# Patient Record
Sex: FEMALE | Race: WHITE | HISPANIC OR LATINO | Employment: FULL TIME | URBAN - METROPOLITAN AREA
[De-identification: names, ages, dates, MRNs, and addresses within clinical notes are randomized per-mention and may not be internally consistent; named-entity substitution may affect disease eponyms.]

---

## 2017-07-10 NOTE — TELEPHONE ENCOUNTER
Patient needs a refill for 3 month supply to cover her until her Annual. Please call patient when complete.

## 2017-07-13 NOTE — TELEPHONE ENCOUNTER
REGLA to call back to verify pharmacy- will call in BC to pharmacy on file and can change if she needs it sent somewhere else.  Pended meds  Routed to Dr. HINTON

## 2017-07-18 RX ORDER — LEVONORGESTREL AND ETHINYL ESTRADIOL 0.1-0.02MG
1 KIT ORAL DAILY
Qty: 97 TAB | Refills: 0 | OUTPATIENT
Start: 2017-07-18

## 2017-11-30 ENCOUNTER — HOSPITAL ENCOUNTER (OUTPATIENT)
Facility: MEDICAL CENTER | Age: 27
End: 2017-11-30
Attending: NURSE PRACTITIONER
Payer: COMMERCIAL

## 2017-11-30 ENCOUNTER — GYNECOLOGY VISIT (OUTPATIENT)
Dept: OBGYN | Facility: MEDICAL CENTER | Age: 27
End: 2017-11-30
Payer: COMMERCIAL

## 2017-11-30 VITALS
SYSTOLIC BLOOD PRESSURE: 105 MMHG | WEIGHT: 141 LBS | HEIGHT: 60 IN | BODY MASS INDEX: 27.68 KG/M2 | DIASTOLIC BLOOD PRESSURE: 60 MMHG

## 2017-11-30 DIAGNOSIS — Z01.419 WELL WOMAN EXAM: ICD-10-CM

## 2017-11-30 DIAGNOSIS — Z30.015 ENCOUNTER FOR INITIAL PRESCRIPTION OF VAGINAL RING HORMONAL CONTRACEPTIVE: ICD-10-CM

## 2017-11-30 DIAGNOSIS — Z12.4 SCREENING FOR CERVICAL CANCER: ICD-10-CM

## 2017-11-30 PROCEDURE — 88175 CYTOPATH C/V AUTO FLUID REDO: CPT

## 2017-11-30 PROCEDURE — 99395 PREV VISIT EST AGE 18-39: CPT | Performed by: NURSE PRACTITIONER

## 2017-11-30 RX ORDER — ETONOGESTREL AND ETHINYL ESTRADIOL VAGINAL RING .015; .12 MG/D; MG/D
RING VAGINAL
Qty: 1 EACH | Refills: 3 | Status: SHIPPED | OUTPATIENT
Start: 2017-11-30 | End: 2019-01-18

## 2017-11-30 NOTE — PATIENT INSTRUCTIONS
Ethinyl Estradiol; Etonogestrel vaginal ring  What is this medicine?  ETHINYL ESTRADIOL; ETONOGESTREL (ETH in il es tra DYE ole; et oh roxana LESIA trel) vaginal ring is a flexible, vaginal ring used as a contraceptive (birth control method). This medicine combines two types of female hormones, an estrogen and a progestin. This ring is used to prevent ovulation and pregnancy. Each ring is effective for one month.  This medicine may be used for other purposes; ask your health care provider or pharmacist if you have questions.  COMMON BRAND NAME(S): NuvaRing  What should I tell my health care provider before I take this medicine?  They need to know if you have or ever had any of these conditions:  -abnormal vaginal bleeding  -blood vessel disease or blood clots  -breast, cervical, endometrial, ovarian, liver, or uterine cancer  -diabetes  -gallbladder disease  -heart disease or recent heart attack  -high blood pressure  -high cholesterol  -kidney disease  -liver disease  -migraine headaches  -stroke  -systemic lupus erythematosus (SLE)  -tobacco smoker  -an unusual or allergic reaction to estrogens, progestins, other medicines, foods, dyes, or preservatives  -pregnant or trying to get pregnant  -breast-feeding  How should I use this medicine?  Insert the ring into your vagina as directed. Follow the directions on the prescription label. The ring will remain place for 3 weeks and is then removed for a 1-week break. A new ring is inserted 1 week after the last ring was removed, on the same day of the week. Do not use more often than directed.  A patient package insert for the product will be given with each prescription and refill. Read this sheet carefully each time. The sheet may change frequently.  Contact your pediatrician regarding the use of this medicine in children. Special care may be needed. This medicine has been used in female children who have started having menstrual periods.  Overdosage: If you think you have  taken too much of this medicine contact a poison control center or emergency room at once.  NOTE: This medicine is only for you. Do not share this medicine with others.  What if I miss a dose?  You will need to replace your vaginal ring once a month as directed. If the ring should slip out, or if you leave it in longer or shorter than you should, contact your health care professional for advice.  What may interact with this medicine?  -acetaminophen  -antibiotics or medicines for infections, especially rifampin, rifabutin, rifapentine, and griseofulvin, and possibly penicillins or tetracyclines  -aprepitant  -ascorbic acid (vitamin C)  -atorvastatin  -barbiturate medicines, such as phenobarbital  -bosentan  -carbamazepine  -caffeine  -clofibrate  -cyclosporine  -dantrolene  -doxercalciferol  -felbamate  -grapefruit juice  -hydrocortisone  -medicines for anxiety or sleeping problems, such as diazepam or temazepam  -medicines for diabetes, including pioglitazone  -modafinil  -mycophenolate  -nefazodone  -oxcarbazepine  -phenytoin  -prednisolone  -ritonavir or other medicines for HIV infection or AIDS  -rosuvastatin  -selegiline  -soy isoflavones supplements  -Bharti's wort  -tamoxifen or raloxifene  -theophylline  -thyroid hormones  -topiramate  -warfarin  This list may not describe all possible interactions. Give your health care provider a list of all the medicines, herbs, non-prescription drugs, or dietary supplements you use. Also tell them if you smoke, drink alcohol, or use illegal drugs. Some items may interact with your medicine.  What should I watch for while using this medicine?  Visit your doctor or health care professional for regular checks on your progress. You will need a regular breast and pelvic exam and Pap smear while on this medicine.  Use an additional method of contraception during the first cycle that you use this ring.  If you have any reason to think you are pregnant, stop using this  medicine right away and contact your doctor or health care professional.  If you are using this medicine for hormone related problems, it may take several cycles of use to see improvement in your condition.  Smoking increases the risk of getting a blood clot or having a stroke while you are using hormonal birth control, especially if you are more than 35 years old. You are strongly advised not to smoke.  This medicine can make your body retain fluid, making your fingers, hands, or ankles swell. Your blood pressure can go up. Contact your doctor or health care professional if you feel you are retaining fluid.  This medicine can make you more sensitive to the sun. Keep out of the sun. If you cannot avoid being in the sun, wear protective clothing and use sunscreen. Do not use sun lamps or tanning beds/booths.  If you wear contact lenses and notice visual changes, or if the lenses begin to feel uncomfortable, consult your eye care specialist.  In some women, tenderness, swelling, or minor bleeding of the gums may occur. Notify your dentist if this happens. Brushing and flossing your teeth regularly may help limit this. See your dentist regularly and inform your dentist of the medicines you are taking.  If you are going to have elective surgery, you may need to stop using this medicine before the surgery. Consult your health care professional for advice.  This medicine does not protect you against HIV infection (AIDS) or any other sexually transmitted diseases.  What side effects may I notice from receiving this medicine?  Side effects that you should report to your doctor or health care professional as soon as possible:  -breast tissue changes or discharge  -changes in vaginal bleeding during your period or between your periods  -chest pain  -coughing up blood  -dizziness or fainting spells  -headaches or migraines  -leg, arm or groin pain  -severe or sudden headaches  -stomach pain (severe)  -sudden shortness of  breath  -sudden loss of coordination, especially on one side of the body  -speech problems  -symptoms of vaginal infection like itching, irritation or unusual discharge  -tenderness in the upper abdomen  -vomiting  -weakness or numbness in the arms or legs, especially on one side of the body  -yellowing of the eyes or skin  Side effects that usually do not require medical attention (report to your doctor or health care professional if they continue or are bothersome):  -breakthrough bleeding and spotting that continues beyond the 3 initial cycles of pills  -breast tenderness  -mood changes, anxiety, depression, frustration, anger, or emotional outbursts  -increased sensitivity to sun or ultraviolet light  -nausea  -skin rash, acne, or brown spots on the skin  -weight gain (slight)  This list may not describe all possible side effects. Call your doctor for medical advice about side effects. You may report side effects to FDA at 1-069-FDA-2886.  Where should I keep my medicine?  Keep out of the reach of children.  Store at room temperature between 15 and 30 degrees C (59 and 86 degrees F) for up to 4 months. The product will  after 4 months. Protect from light. Throw away any unused medicine after the expiration date.  NOTE: This sheet is a summary. It may not cover all possible information. If you have questions about this medicine, talk to your doctor, pharmacist, or health care provider.  © 2014, [employer]. (12/3/2009 12:03:58 PM)

## 2017-12-01 LAB — CYTOLOGY REG CYTOL: NORMAL

## 2017-12-01 NOTE — PROGRESS NOTES
CC: Soy Parker is a 27 y.o. y.o. female who presents for her Gynecologic Exam        HPI Comments: Pt presents for well woman exam. Pt has  complaints today that she has been getting  A lot of Nausea and breast tenderness and ill feelings every month when she restarts her OCP's after her period and placebos. This has been happening for over 6 months now and she desires to trial a new method. Patient's last menstrual period was 11/15/2017.  .  Review of Systems :  Cardio: neg  Respiratory: Asthma  Constitutional: neg  : neg  Abdominal: Nausea with restart of OCPS  Psychosocial: neg  EENT:  Metabolic:  Pertinent positives documented in HPI and all other systems reviewed & are negative    All PMH, PSH, allergies, social history and FH reviewed and updated today:  Past Medical History:   Diagnosis Date   • Anemia    • ASTHMA     exercise-induced asthma   • Nose trouble     nose bleeds     History reviewed. No pertinent surgical history.  Penicillins  Social History     Social History   • Marital status: Single     Spouse name: N/A   • Number of children: N/A   • Years of education: N/A     Social History Main Topics   • Smoking status: Never Smoker   • Smokeless tobacco: Never Used   • Alcohol use No   • Drug use: No   • Sexual activity: Yes     Partners: Male     Other Topics Concern   • Not on file     Social History Narrative   • No narrative on file     Family History   Problem Relation Age of Onset   • Diabetes Maternal Grandmother      type II   • Diabetes Maternal Grandfather      type II   • Heart Disease Maternal Grandfather      stents   • Heart Attack Maternal Grandfather    • Cancer Paternal Grandmother 60     reprodcutive organ   • Heart Attack Paternal Grandfather    • Hyperlipidemia Mother    • Hypertension Mother    • Arthritis Father    • Arthritis Brother      Medications:   Current Outpatient Prescriptions [facility]   Medication Sig Dispense Refill   • ethinyl estradiol-etonogestrel  (NUVARING) 0.12-0.015 MG/24HR vaginal ring Place vaginal ring in the vagina for 28-30 days then remove for 3 days, replace with new ring 1 Each 3     No current Epic-ordered facility-administered medications on file.           Objective:   Vital measurements:  Blood pressure 105/60, height 1.524 m (5'), weight 64 kg (141 lb), last menstrual period 11/15/2017, not currently breastfeeding.  Body mass index is 27.54 kg/m². (Goal BM I>18 <25)    Physical Exam   Nursing note and vitals reviewed.  Constitutional: She is oriented to person, place, and time. She appears well-developed and well-nourished. No distress.     HEENT:   Head: Normocephalic and atraumatic.   Right Ear: External ear normal.   Left Ear: External ear normal.   Nose: Nose normal.   Eyes: Conjunctivae and EOM are normal. Pupils are equal, round, and reactive to light. No scleral icterus.     Neck: Normal range of motion. Neck supple. No tracheal deviation present. No thyromegaly present.     Pulmonary/Chest: Effort normal and breath sounds normal. No respiratory distress. She has no wheezes. She has no rales. She exhibits no tenderness.     Cardiovascular: Regular, rate and rhythm. No JVD.    Abdominal: Soft. Bowel sounds are normal. She exhibits no distension and no mass. No tenderness. She has no rebound and no guarding.     Breast:  Symmetrical, normal consistency without masses., No dimpling or skin changes, Normal nipples without discharge, no axillary lymphadenopathy, negative    Genitourinary:  Pelvic exam was performed with patient supine.  External genitalia with no abnormal pigmentation, labial fusion,rash, tenderness, lesion or injury to the labia bilaterally.  Vagina is moist with no lesions, foul discharge, erythema, tenderness or bleeding. No foreign body around the vagina or signs of injury.   Cervix exhibits no motion tenderness, no discharge and no friability.   Uterus is AV not deviated, not enlarged, not fixed and not tender.  Right  adnexum displays no mass, no tenderness and no fullness. Left adnexum displays no mass, no tenderness and no fullness.     Musculoskeletal: Normal range of motion. She exhibits no edema and no tenderness.     Lymphadenopathy: She has no cervical adenopathy.     Neurological: She is alert and oriented to person, place, and time. She exhibits normal muscle tone.     Skin: Skin is warm and dry. No rash noted. She is not diaphoretic. No erythema. No pallor.     Psychiatric: She has a normal mood and affect. Her behavior is normal. Judgment and thought content normal.               Assessment:     1. Well woman exam  ThinPrep Pap, reflex HPV on ASC-US only   2. Screening for cervical cancer  ThinPrep Pap, reflex HPV on ASC-US only   3. Encounter for initial prescription of vaginal ring hormonal contraceptive  ethinyl estradiol-etonogestrel (NUVARING) 0.12-0.015 MG/24HR vaginal ring         Plan:   Pap and physical exam performed  Monthly SBE.  Counseling: STD prevention, HIV risk factors and prevention, use and side effects of OCPs, family planning choices and adequate intake of calcium and vitamin D, Nuvaring instructions risks and benefits are discussed and pt is given written info as well. Verbalizes understanding  Encourage exercise and proper diet.  Mammograms starting @ age 40 annually.  See medications and orders placed in encounter report.  Return to clinic: 3 months for recheck of new BCM

## 2018-03-14 ENCOUNTER — TELEPHONE (OUTPATIENT)
Dept: MEDICAL GROUP | Age: 28
End: 2018-03-14

## 2018-03-14 DIAGNOSIS — Z23 NEED FOR VACCINATION: ICD-10-CM

## 2018-03-14 NOTE — TELEPHONE ENCOUNTER
Patient is on the MA Schedule tomorrow for HEP A, HPV vaccine/injection.    SPECIFIC Action To Be Taken: Orders pending, please sign.

## 2018-03-15 ENCOUNTER — APPOINTMENT (OUTPATIENT)
Dept: MEDICAL GROUP | Age: 28
End: 2018-03-15
Payer: COMMERCIAL

## 2018-04-03 ENCOUNTER — NON-PROVIDER VISIT (OUTPATIENT)
Dept: MEDICAL GROUP | Age: 28
End: 2018-04-03
Payer: COMMERCIAL

## 2018-04-03 DIAGNOSIS — Z23 NEED FOR VACCINATION: ICD-10-CM

## 2018-04-03 PROCEDURE — 90471 IMMUNIZATION ADMIN: CPT | Performed by: INTERNAL MEDICINE

## 2018-04-03 PROCEDURE — 90651 9VHPV VACCINE 2/3 DOSE IM: CPT | Performed by: INTERNAL MEDICINE

## 2018-04-03 PROCEDURE — 90632 HEPA VACCINE ADULT IM: CPT | Performed by: INTERNAL MEDICINE

## 2018-05-14 ENCOUNTER — TELEPHONE (OUTPATIENT)
Dept: OBGYN | Facility: CLINIC | Age: 28
End: 2018-05-14

## 2018-05-14 NOTE — TELEPHONE ENCOUNTER
Called and informed the patient that I called it in to the pharmacy and then after I called they called and said they received it but either way it should be ready for her in a couple of hours. Pt satisfied and has no further questions at this time.

## 2018-05-14 NOTE — TELEPHONE ENCOUNTER
----- Message from Valentina Stafford sent at 5/14/2018 11:47 AM PDT -----  Pt states pharm has contacted us several times re: her BC Nuvaring - they won't fill it until we call them

## 2018-06-05 ENCOUNTER — OFFICE VISIT (OUTPATIENT)
Dept: MEDICAL GROUP | Age: 28
End: 2018-06-05
Payer: COMMERCIAL

## 2018-06-05 VITALS
HEART RATE: 67 BPM | WEIGHT: 141 LBS | HEIGHT: 60 IN | TEMPERATURE: 97.7 F | BODY MASS INDEX: 27.68 KG/M2 | SYSTOLIC BLOOD PRESSURE: 112 MMHG | DIASTOLIC BLOOD PRESSURE: 66 MMHG | OXYGEN SATURATION: 98 %

## 2018-06-05 DIAGNOSIS — R19.09 CENTRAL ABDOMINAL MASS: Primary | ICD-10-CM

## 2018-06-05 PROCEDURE — 99213 OFFICE O/P EST LOW 20 MIN: CPT | Performed by: FAMILY MEDICINE

## 2018-06-05 ASSESSMENT — PATIENT HEALTH QUESTIONNAIRE - PHQ9: CLINICAL INTERPRETATION OF PHQ2 SCORE: 0

## 2018-06-05 NOTE — PROGRESS NOTES
Subjective:   CC: Abdominal mass    HPI:     Soy Zhu is a 28 y.o. female, established patient of the clinic, presents with the following concerns:     Patient was in her normal state of health until approximately 7 days ago when she noticed a small, mildly tender, abdominal mass between the rectus abdominis muscles. Nothing makes symptoms worse or better. Patient has been manipulating the concerning area leading to minor discomfort. Patient states as she recently started weight training. Denies fever, chills, nausea, vomiting, abdominal pain, hematochezia, melena.    Current medicines (including changes today)  Current Outpatient Prescriptions   Medication Sig Dispense Refill   • ethinyl estradiol-etonogestrel (NUVARING) 0.12-0.015 MG/24HR vaginal ring Place vaginal ring in the vagina for 28-30 days then remove for 3 days, replace with new ring 1 Each 3     No current facility-administered medications for this visit.      She  has a past medical history of Anemia; ASTHMA; and Nose trouble. She also has no past medical history of Diabetes or Seizure ([facility]).    I personally reviewed patient's problem list, allergies, medications, family hx, social hx with patient and update [facility].     REVIEW OF SYSTEMS:  CONSTITUTIONAL:  Denies night sweats, fatigue, malaise, lethargy, fever or chills.  RESPIRATORY:  Denies cough, wheeze, hemoptysis, or shortness of breath.  CARDIOVASCULAR:  Denies chest pains, palpitations, pedal edema     Objective:     Blood pressure 112/66, pulse 67, temperature 36.5 °C (97.7 °F), height 1.524 m (5'), weight 64 kg (141 lb), SpO2 98 %. Body mass index is 27.54 kg/m².    Physical Exam:  Constitutional: awake, alert, in no distress.  Skin: Warm, dry, good turgor, no rashes, bruises, ulcers in visible areas.  Respiratory: Unlabored respiratory effort, lungs clear to auscultation, no wheezes, no rales.  Cardiovascular: Normal S1, S2, no murmur, no pedal edema.  Abdomen: Soft, non-tender to  palpation, active BS, no hepatosplenomegaly, negative rebound or guarding.   - 4 cm, elongated, firm, mildly tender mass located between the rectus abdominis, not worse with valsalva.   Psych: Oriented x3, affect and mood wnl, intact judgement and insight.       Assessment and Plan:   The following treatment plan was discussed    1. Central abdominal mass  History and exam concerning for diastasis associated with recent increased exercise intensity and weigh training.   - US-HERNIA ABDOMEN; Future  - general counseling regarding pathogenesis, prevention, and treatment of this condition provided.     Claudia Rojas M.D.      Followup: Return for As needed.    Please note that this dictation was created using voice recognition software. I have made every reasonable attempt to correct obvious errors, but I expect that there are errors of grammar and possibly content that I did not discover before finalizing the note.

## 2018-06-13 ENCOUNTER — HOSPITAL ENCOUNTER (OUTPATIENT)
Dept: RADIOLOGY | Facility: MEDICAL CENTER | Age: 28
End: 2018-06-13
Attending: FAMILY MEDICINE
Payer: COMMERCIAL

## 2018-06-13 DIAGNOSIS — R19.09 CENTRAL ABDOMINAL MASS: ICD-10-CM

## 2018-06-13 PROCEDURE — 76705 ECHO EXAM OF ABDOMEN: CPT

## 2018-06-14 PROBLEM — K43.9 VENTRAL HERNIA WITHOUT OBSTRUCTION OR GANGRENE: Status: ACTIVE | Noted: 2018-06-14

## 2018-08-06 NOTE — PROGRESS NOTES
"Soy Zhu is a 28 y.o. female here for a non-provider visit for:   HEPATITIS A 1 of 2  HPV 1 of 3    Reason for immunization: Overdue/Provider Recommended  Immunization records indicate need for vaccine: Yes, confirmed with Epic and confirmed with [employer]  Minimum interval has been met for this vaccine: Yes  ABN completed: Not Indicated    Order and dose verified by: Unknown  VIS Dated  HEP A 7/20/16 and HPV 12/2/16  was given to patient: Yes  All IAC Questionnaire questions were answered \"No.\"    Patient tolerated injection and no adverse effects were observed or reported: Yes    Pt scheduled for next dose in series: No      * This was administered by Joanna Carr*    "

## 2018-10-31 ENCOUNTER — TELEPHONE (OUTPATIENT)
Dept: OBGYN | Facility: CLINIC | Age: 28
End: 2018-10-31

## 2018-10-31 NOTE — TELEPHONE ENCOUNTER
Pt called to get a refill for Nuvaring, was advised to call office to set up an appt.  Rx was called in for a 3 month suplpy refill.  Pt agreed to set up an appt and understands that won't  be able to get more refill if she doesn't get an annual.

## 2019-01-18 ENCOUNTER — OFFICE VISIT (OUTPATIENT)
Dept: MEDICAL GROUP | Age: 29
End: 2019-01-18
Payer: COMMERCIAL

## 2019-01-18 VITALS
WEIGHT: 150 LBS | HEIGHT: 60 IN | DIASTOLIC BLOOD PRESSURE: 60 MMHG | OXYGEN SATURATION: 98 % | BODY MASS INDEX: 29.45 KG/M2 | TEMPERATURE: 98.2 F | HEART RATE: 86 BPM | SYSTOLIC BLOOD PRESSURE: 102 MMHG

## 2019-01-18 DIAGNOSIS — Z23 NEED FOR VACCINATION: ICD-10-CM

## 2019-01-18 DIAGNOSIS — Z02.0 SCHOOL PHYSICAL EXAM: ICD-10-CM

## 2019-01-18 DIAGNOSIS — E66.01 OBESITY, MORBID, BMI 40.0-49.9 (HCC): ICD-10-CM

## 2019-01-18 PROCEDURE — 99395 PREV VISIT EST AGE 18-39: CPT | Performed by: FAMILY MEDICINE

## 2019-01-18 ASSESSMENT — PATIENT HEALTH QUESTIONNAIRE - PHQ9: CLINICAL INTERPRETATION OF PHQ2 SCORE: 0

## 2019-01-19 ENCOUNTER — HOSPITAL ENCOUNTER (OUTPATIENT)
Dept: LAB | Facility: MEDICAL CENTER | Age: 29
End: 2019-01-19
Attending: FAMILY MEDICINE
Payer: COMMERCIAL

## 2019-01-19 DIAGNOSIS — Z23 NEED FOR VACCINATION: ICD-10-CM

## 2019-01-19 DIAGNOSIS — E66.01 OBESITY, MORBID, BMI 40.0-49.9 (HCC): ICD-10-CM

## 2019-01-19 LAB
ALBUMIN SERPL BCP-MCNC: 4.2 G/DL (ref 3.2–4.9)
ALBUMIN/GLOB SERPL: 1.5 G/DL
ALP SERPL-CCNC: 41 U/L (ref 30–99)
ALT SERPL-CCNC: 12 U/L (ref 2–50)
ANION GAP SERPL CALC-SCNC: 7 MMOL/L (ref 0–11.9)
AST SERPL-CCNC: 19 U/L (ref 12–45)
BILIRUB SERPL-MCNC: 0.6 MG/DL (ref 0.1–1.5)
BUN SERPL-MCNC: 7 MG/DL (ref 8–22)
CALCIUM SERPL-MCNC: 9.5 MG/DL (ref 8.5–10.5)
CHLORIDE SERPL-SCNC: 108 MMOL/L (ref 96–112)
CHOLEST SERPL-MCNC: 148 MG/DL (ref 100–199)
CO2 SERPL-SCNC: 26 MMOL/L (ref 20–33)
CREAT SERPL-MCNC: 0.86 MG/DL (ref 0.5–1.4)
ERYTHROCYTE [DISTWIDTH] IN BLOOD BY AUTOMATED COUNT: 41.5 FL (ref 35.9–50)
GLOBULIN SER CALC-MCNC: 2.8 G/DL (ref 1.9–3.5)
GLUCOSE SERPL-MCNC: 86 MG/DL (ref 65–99)
HCT VFR BLD AUTO: 39.9 % (ref 37–47)
HDLC SERPL-MCNC: 46 MG/DL
HGB BLD-MCNC: 13.2 G/DL (ref 12–16)
LDLC SERPL CALC-MCNC: 88 MG/DL
MCH RBC QN AUTO: 29.4 PG (ref 27–33)
MCHC RBC AUTO-ENTMCNC: 33.1 G/DL (ref 33.6–35)
MCV RBC AUTO: 88.9 FL (ref 81.4–97.8)
PLATELET # BLD AUTO: 173 K/UL (ref 164–446)
PMV BLD AUTO: 12.4 FL (ref 9–12.9)
POTASSIUM SERPL-SCNC: 3.9 MMOL/L (ref 3.6–5.5)
PROT SERPL-MCNC: 7 G/DL (ref 6–8.2)
RBC # BLD AUTO: 4.49 M/UL (ref 4.2–5.4)
SODIUM SERPL-SCNC: 141 MMOL/L (ref 135–145)
TRIGL SERPL-MCNC: 69 MG/DL (ref 0–149)
VZV IGG SER IA-ACNC: 2.05
WBC # BLD AUTO: 6.9 K/UL (ref 4.8–10.8)

## 2019-01-19 PROCEDURE — 36415 COLL VENOUS BLD VENIPUNCTURE: CPT

## 2019-01-19 PROCEDURE — 85027 COMPLETE CBC AUTOMATED: CPT

## 2019-01-19 PROCEDURE — 80061 LIPID PANEL: CPT

## 2019-01-19 PROCEDURE — 80053 COMPREHEN METABOLIC PANEL: CPT

## 2019-01-19 PROCEDURE — 86787 VARICELLA-ZOSTER ANTIBODY: CPT

## 2019-01-19 NOTE — ASSESSMENT & PLAN NOTE
The patient is a 28-year-old female who presents to clinic with a chief complaint of needing a physical and medical clearance to attend  [employer] school.  She has no active medical history, she takes no prescription medications.   The patient denied any chest pain, no sob, no collins, no  pnd, no orthopnea, no headache, no changes in vision, no numbness or tingling, no nausea, no diarrhea, no abdominal pain, no fevers, no chills, no bright red blood per rectum, no  difficulty urinating, no burning during micturition, no depressed mood, no other concerns.

## 2019-01-19 NOTE — PROGRESS NOTES
This medical record contains text that has been entered with the assistance of computer voice recognition and dictation software.  Therefore, it may contain unintended errors in text, spelling, punctuation, or grammar    Chief Complaint   Patient presents with   • Other     clearance for school         Soy Zhu is a 28 y.o. 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  She has no active medical history, she takes no prescription medications.   The patient denied any chest pain, no sob, no collins, no  pnd, no orthopnea, no headache, no changes in vision, no numbness or tingling, no nausea, no diarrhea, no abdominal pain, no fevers, no chills, no bright red blood per rectum, no  difficulty urinating, no burning during micturition, no depressed mood, no other concerns.            Current medicines (including changes today)  No current outpatient prescriptions on file.     No current facility-administered medications for this visit.      She  has a past medical history of Anemia; ASTHMA; and Nose trouble. She also has no past medical history of Diabetes or Seizure ([facility]).  She  has no past surgical history on file.  Social History   Substance Use Topics   • Smoking status: Never Smoker   • Smokeless tobacco: Never Used   • Alcohol use No     Social History     Social History Narrative   • No narrative on file     Family History   Problem Relation Age of Onset   • Diabetes Maternal Grandmother         type II   • Diabetes Maternal Grandfather         type II   • Heart Disease Maternal Grandfather         stents   • Heart Attack Maternal Grandfather    • Cancer Paternal Grandmother 60        reprodcutive organ   • Heart Attack Paternal Grandfather    • Hyperlipidemia Mother    • Hypertension Mother    •  Arthritis Father    • Arthritis Brother      Family Status   Relation Status   • MGMo Alive   • MGFa Alive   • PGMo Alive   • PGFa  at age 45        Heart attack   • Mo Alive   • Fa Alive   • Yanci Alive   • Bro Alive   • Bro Alive         ROS    Please see hpi     All other systems reviewed and are negative     Objective:     Blood pressure 102/60, pulse 86, temperature 36.8 °C (98.2 °F), temperature source Temporal, height 1.524 m (5'), weight 68 kg (150 lb), SpO2 98 %, not currently breastfeeding. Body mass index is 29.29 kg/m².  Physical Exam:    Constitutional: Alert, no distress.  Skin: Warm, dry, good turgor, no rashes in visible areas.  Eye: Equal, round and reactive, conjunctiva clear, lids normal.  ENMT: Lips without lesions, good dentition, oropharynx clear.  Neck: Trachea midline, no masses, no thyromegaly. No cervical or supraclavicular lymphadenopathy.  Respiratory: Unlabored respiratory effort, lungs clear to auscultation, no wheezes, no ronchi.  Cardiovascular: Normal S1, S2, no murmur, no edema.  Abdomen: Soft, non-tender, no masses, no hepatosplenomegaly.  Psych: Alert and oriented x3, normal affect and mood.          Assessment and Plan:   The following treatment plan was discussed      1. Need for vaccination  Needs verification of immunity  - VARICELLA ZOSTER IGG AB; Future    2. School physical exam  Patient is cleared for physical therapy school    3. Obesity, morbid, BMI 40.0-49.9 ([facility])    Appropriate counseling given    - Lipid Profile; Future  - CBC WITHOUT DIFFERENTIAL; Future  - COMP METABOLIC PANEL; Future  - TSH WITH REFLEX TO FT4          Instructed to Follow up in clinic or ER for worsening symptoms, difficulty breathing, lack of expected recovery, or should new symptoms or problems arise.    Followup: Return in about 6 months (around 2019) for Reevaluation, With PCP.       Once again this medical record contains text that has been entered with the assistance of computer  voice recognition and dictation software.  Therefore, it may contain unintended errors in text, spelling, punctuation, or grammar

## 2019-07-30 ENCOUNTER — OFFICE VISIT (OUTPATIENT)
Dept: URGENT CARE | Facility: CLINIC | Age: 29
End: 2019-07-30
Payer: COMMERCIAL

## 2019-07-30 ENCOUNTER — HOSPITAL ENCOUNTER (OUTPATIENT)
Facility: MEDICAL CENTER | Age: 29
End: 2019-07-30
Attending: NURSE PRACTITIONER
Payer: COMMERCIAL

## 2019-07-30 VITALS
SYSTOLIC BLOOD PRESSURE: 124 MMHG | OXYGEN SATURATION: 95 % | HEART RATE: 105 BPM | RESPIRATION RATE: 15 BRPM | HEIGHT: 60 IN | TEMPERATURE: 97.5 F | BODY MASS INDEX: 29.45 KG/M2 | WEIGHT: 150 LBS | DIASTOLIC BLOOD PRESSURE: 60 MMHG

## 2019-07-30 DIAGNOSIS — N39.0 URINARY TRACT INFECTION WITH HEMATURIA, SITE UNSPECIFIED: ICD-10-CM

## 2019-07-30 DIAGNOSIS — R31.9 URINARY TRACT INFECTION WITH HEMATURIA, SITE UNSPECIFIED: ICD-10-CM

## 2019-07-30 DIAGNOSIS — R30.0 DYSURIA: ICD-10-CM

## 2019-07-30 LAB
APPEARANCE UR: CLEAR
BILIRUB UR STRIP-MCNC: NEGATIVE MG/DL
COLOR UR AUTO: YELLOW
GLUCOSE UR STRIP.AUTO-MCNC: NEGATIVE MG/DL
KETONES UR STRIP.AUTO-MCNC: NEGATIVE MG/DL
LEUKOCYTE ESTERASE UR QL STRIP.AUTO: NORMAL
NITRITE UR QL STRIP.AUTO: NEGATIVE
PH UR STRIP.AUTO: 6 [PH] (ref 5–8)
PROT UR QL STRIP: 30 MG/DL
RBC UR QL AUTO: NORMAL
SP GR UR STRIP.AUTO: 1
UROBILINOGEN UR STRIP-MCNC: 0.2 MG/DL

## 2019-07-30 PROCEDURE — 87086 URINE CULTURE/COLONY COUNT: CPT

## 2019-07-30 PROCEDURE — 87077 CULTURE AEROBIC IDENTIFY: CPT

## 2019-07-30 PROCEDURE — 87186 SC STD MICRODIL/AGAR DIL: CPT

## 2019-07-30 PROCEDURE — 81002 URINALYSIS NONAUTO W/O SCOPE: CPT | Performed by: NURSE PRACTITIONER

## 2019-07-30 PROCEDURE — 99214 OFFICE O/P EST MOD 30 MIN: CPT | Performed by: NURSE PRACTITIONER

## 2019-07-30 RX ORDER — NITROFURANTOIN 25; 75 MG/1; MG/1
100 CAPSULE ORAL EVERY 12 HOURS
Qty: 10 CAP | Refills: 0 | Status: SHIPPED | OUTPATIENT
Start: 2019-07-30 | End: 2019-08-04

## 2019-07-30 RX ORDER — PHENAZOPYRIDINE HYDROCHLORIDE 200 MG/1
200 TABLET, FILM COATED ORAL 3 TIMES DAILY
Qty: 6 TAB | Refills: 0 | Status: SHIPPED | OUTPATIENT
Start: 2019-07-30 | End: 2019-08-01

## 2019-07-30 ASSESSMENT — ENCOUNTER SYMPTOMS
FEVER: 0
FLANK PAIN: 0
CHILLS: 0

## 2019-07-31 DIAGNOSIS — N39.0 URINARY TRACT INFECTION WITH HEMATURIA, SITE UNSPECIFIED: ICD-10-CM

## 2019-07-31 DIAGNOSIS — R31.9 URINARY TRACT INFECTION WITH HEMATURIA, SITE UNSPECIFIED: ICD-10-CM

## 2019-07-31 NOTE — PROGRESS NOTES
Subjective:      Soy Zhu is a 29 y.o. female who presents with UTI            Dysuria    This is a new problem. The current episode started today. The problem occurs every urination. The problem has been gradually worsening. The quality of the pain is described as burning. She is sexually active. There is no history of pyelonephritis. Associated symptoms include frequency, hesitancy and urgency. Pertinent negatives include no chills, discharge or flank pain. She has tried increased fluids for the symptoms. The treatment provided no relief. There is no history of recurrent UTIs.       Review of Systems   Constitutional: Negative for chills and fever.   Genitourinary: Positive for dysuria, frequency, hesitancy and urgency. Negative for flank pain.   All other systems reviewed and are negative.      Past Medical History:   Diagnosis Date   • Anemia    • ASTHMA     exercise-induced asthma   • Nose trouble     nose bleeds    No past surgical history on file.   Social History     Social History   • Marital status:      Spouse name: N/A   • Number of children: N/A   • Years of education: N/A     Occupational History   • Not on file.     Social History Main Topics   • Smoking status: Never Smoker   • Smokeless tobacco: Never Used   • Alcohol use No   • Drug use: No   • Sexual activity: Yes     Partners: Male     Other Topics Concern   • Not on file     Social History Narrative   • No narrative on file        Objective:     /60   Pulse (!) 105   Temp 36.4 °C (97.5 °F) (Temporal)   Resp 15   Ht 1.524 m (5')   Wt 68 kg (150 lb)   SpO2 95%   BMI 29.29 kg/m²      Physical Exam   Constitutional: She is oriented to person, place, and time. Vital signs are normal. She appears well-developed and well-nourished.   HENT:   Head: Normocephalic and atraumatic.   Eyes: Pupils are equal, round, and reactive to light. EOM are normal.   Neck: Normal range of motion.   Cardiovascular: Normal rate and regular  rhythm.    Pulmonary/Chest: Effort normal.   Abdominal: Soft. Normal appearance. There is tenderness in the suprapubic area. There is no CVA tenderness.   Musculoskeletal: Normal range of motion.   Neurological: She is alert and oriented to person, place, and time.   Skin: Skin is warm and dry. Capillary refill takes less than 2 seconds.   Psychiatric: She has a normal mood and affect. Her speech is normal and behavior is normal. Thought content normal.   Vitals reviewed.                Lab Results   Component Value Date/Time    POCCOLOR yellow 07/30/2019 11:19 PM    POCAPPEAR clear 07/30/2019 11:19 PM    POCLEUKEST small 07/30/2019 11:19 PM    POCNITRITE negative 07/30/2019 11:19 PM    POCUROBILIGE 0.2 07/30/2019 11:19 PM    POCPROTEIN 30 07/30/2019 11:19 PM    POCURPH 6.0 07/30/2019 11:19 PM    POCBLOOD large 07/30/2019 11:19 PM    POCSPGRV 1.005 07/30/2019 11:19 PM    POCKETONES negative 07/30/2019 11:19 PM    POCBILIRUBIN negative 07/30/2019 11:19 PM    POCGLUCUA negative 07/30/2019 11:19 PM      Assessment/Plan:     1. Dysuria  - POCT Urinalysis    2. Urinary tract infection with hematuria, site unspecified  - Urine Culture; Future  - nitrofurantoin monohyd macro (MACROBID) 100 MG Cap; Take 1 Cap by mouth every 12 hours for 5 days.  Dispense: 10 Cap; Refill: 0  - phenazopyridine (PYRIDIUM) 200 MG Tab; Take 1 Tab by mouth 3 times a day for 2 days.  Dispense: 6 Tab; Refill: 0    Will call with cx results if I need to change abx  Increase water intake  Tylenol and ibuprofen as needed  Supportive care, differential diagnoses, and indications for immediate follow-up discussed with patient.    Pathogenesis of diagnosis discussed including typical length and natural progression.    Instructed to return to  or nearest emergency department if symptoms fail to improve, for any change in condition, further concerns, or new concerning symptoms.  Patient states understanding of the plan of care and discharge  instructions.

## 2019-08-02 LAB
BACTERIA UR CULT: ABNORMAL
BACTERIA UR CULT: ABNORMAL
SIGNIFICANT IND 70042: ABNORMAL
SITE SITE: ABNORMAL
SOURCE SOURCE: ABNORMAL

## 2019-08-06 ENCOUNTER — HOSPITAL ENCOUNTER (OUTPATIENT)
Facility: MEDICAL CENTER | Age: 29
End: 2019-08-06
Attending: PHYSICIAN ASSISTANT
Payer: COMMERCIAL

## 2019-08-06 ENCOUNTER — OFFICE VISIT (OUTPATIENT)
Dept: URGENT CARE | Facility: CLINIC | Age: 29
End: 2019-08-06
Payer: COMMERCIAL

## 2019-08-06 VITALS
HEIGHT: 60 IN | BODY MASS INDEX: 29.96 KG/M2 | OXYGEN SATURATION: 100 % | HEART RATE: 74 BPM | TEMPERATURE: 97.7 F | DIASTOLIC BLOOD PRESSURE: 58 MMHG | WEIGHT: 152.6 LBS | SYSTOLIC BLOOD PRESSURE: 96 MMHG

## 2019-08-06 DIAGNOSIS — R30.0 DYSURIA: ICD-10-CM

## 2019-08-06 LAB
APPEARANCE UR: CLEAR
BILIRUB UR STRIP-MCNC: NORMAL MG/DL
COLOR UR AUTO: YELLOW
GLUCOSE UR STRIP.AUTO-MCNC: NORMAL MG/DL
INT CON NEG: NORMAL
INT CON POS: NORMAL
KETONES UR STRIP.AUTO-MCNC: NORMAL MG/DL
LEUKOCYTE ESTERASE UR QL STRIP.AUTO: NORMAL
NITRITE UR QL STRIP.AUTO: NORMAL
PH UR STRIP.AUTO: 7 [PH] (ref 5–8)
POC URINE PREGNANCY TEST: NEGATIVE
PROT UR QL STRIP: NORMAL MG/DL
RBC UR QL AUTO: NORMAL
SP GR UR STRIP.AUTO: 1.01
UROBILINOGEN UR STRIP-MCNC: 0.2 MG/DL

## 2019-08-06 PROCEDURE — 81025 URINE PREGNANCY TEST: CPT | Performed by: PHYSICIAN ASSISTANT

## 2019-08-06 PROCEDURE — 87086 URINE CULTURE/COLONY COUNT: CPT

## 2019-08-06 PROCEDURE — 99214 OFFICE O/P EST MOD 30 MIN: CPT | Performed by: PHYSICIAN ASSISTANT

## 2019-08-06 PROCEDURE — 81002 URINALYSIS NONAUTO W/O SCOPE: CPT | Performed by: PHYSICIAN ASSISTANT

## 2019-08-06 RX ORDER — SULFAMETHOXAZOLE AND TRIMETHOPRIM 800; 160 MG/1; MG/1
1 TABLET ORAL EVERY 12 HOURS
Qty: 6 TAB | Refills: 0 | Status: SHIPPED | OUTPATIENT
Start: 2019-08-06 | End: 2019-08-09

## 2019-08-06 ASSESSMENT — ENCOUNTER SYMPTOMS
FLANK PAIN: 0
FEVER: 0
CHILLS: 0

## 2019-08-06 NOTE — PROGRESS NOTES
Subjective:   Soy Zhu is a 29 y.o. female who presents today with   Chief Complaint   Patient presents with   • UTI     was dx with uti last tuesday, compleated anti biotics       Dysuria    This is a new problem. The current episode started yesterday. The problem occurs every urination. The problem has been unchanged. The quality of the pain is described as aching. The pain is mild. There has been no fever. Associated symptoms include frequency and urgency. Pertinent negatives include no chills, discharge, flank pain or possible pregnancy.   Patient states her symptoms went away but came back since yesterday. She finished out her antibiotics and culture came back sensitive for antibiotic she was started on.  Symptoms have come back but not as bad as last week.   PMH:  has a past medical history of Anemia, ASTHMA, and Nose trouble. She also has no past medical history of Diabetes or Seizure ([facility]).  MEDS:   Current Outpatient Medications:   •  sulfamethoxazole-trimethoprim (BACTRIM DS) 800-160 MG tablet, Take 1 Tab by mouth every 12 hours for 3 days., Disp: 6 Tab, Rfl: 0  ALLERGIES:   Allergies   Allergen Reactions   • Penicillins Hives     SURGHX: History reviewed. No pertinent surgical history.  SOCHX:  reports that she has never smoked. She has never used smokeless tobacco. She reports that she does not drink alcohol or use drugs.  FH: Reviewed with patient, not pertinent to this visit.     Review of Systems   Constitutional: Negative for chills and fever.   Genitourinary: Positive for dysuria, frequency and urgency. Negative for flank pain.   All other systems reviewed and are negative.     Objective:   BP (!) 96/58 (BP Location: Right arm, Patient Position: Sitting)   Pulse 74   Temp 36.5 °C (97.7 °F) (Temporal)   Ht 1.524 m (5')   Wt 69.2 kg (152 lb 9.6 oz)   SpO2 100%   BMI 29.80 kg/m²   Physical Exam   Constitutional: She appears well-developed and well-nourished. No distress.   HENT:    Head: Normocephalic and atraumatic.   Right Ear: Hearing normal.   Left Ear: Hearing normal.   Eyes: Pupils are equal, round, and reactive to light.   Cardiovascular: Normal rate, regular rhythm and normal heart sounds.   Pulmonary/Chest: Effort normal and breath sounds normal.   Abdominal: There is no tenderness.   Genitourinary:   Genitourinary Comments: Patient deferred  exam   Musculoskeletal:   Normal movement in all 4 extremities   Neurological: She is alert. Coordination normal.   Skin: Skin is warm and dry.   Psychiatric: She has a normal mood and affect.   Nursing note and vitals reviewed.  NEG CVA tenderness bilaterally.   UA consistent with UTI  PREGNANCY  Assessment/Plan:   Assessment    1. Dysuria  - POCT Urinalysis  - POCT PREGNANCY  - Urine Culture; Future  - sulfamethoxazole-trimethoprim (BACTRIM DS) 800-160 MG tablet; Take 1 Tab by mouth every 12 hours for 3 days.  Dispense: 6 Tab; Refill: 0  Will follow up with urine culture.   Differential diagnosis, natural history, supportive care, and indications for immediate follow-up discussed.   Patient given instructions and understanding of medications and treatment.    If not improving in 3-5 days, F/U with PCP or return to UC if symptoms worsen.    Patient agreeable to plan.      Please note that this dictation was created using voice recognition software. I have made every reasonable attempt to correct obvious errors, but I expect that there are errors of grammar and possibly content that I did not discover before finalizing the note.    Griffin Hernandez PA-C

## 2019-08-08 LAB
BACTERIA UR CULT: NORMAL
SIGNIFICANT IND 70042: NORMAL
SITE SITE: NORMAL
SOURCE SOURCE: NORMAL

## 2019-10-28 ENCOUNTER — TELEPHONE (OUTPATIENT)
Dept: OBGYN | Facility: CLINIC | Age: 29
End: 2019-10-28

## 2019-10-28 NOTE — TELEPHONE ENCOUNTER
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  2. What sh needs to avoid while in [place], advised to avoid lifting over 20 lbs and if she needs to be exposed to X-rays to wear abdominal shield.    Verbalized understanding

## 2019-10-29 ENCOUNTER — OFFICE VISIT (OUTPATIENT)
Dept: MEDICAL GROUP | Age: 29
End: 2019-10-29
Payer: COMMERCIAL

## 2019-10-29 VITALS
BODY MASS INDEX: 30.47 KG/M2 | SYSTOLIC BLOOD PRESSURE: 110 MMHG | TEMPERATURE: 98.6 F | DIASTOLIC BLOOD PRESSURE: 50 MMHG | HEART RATE: 80 BPM | WEIGHT: 155.2 LBS | HEIGHT: 60 IN | OXYGEN SATURATION: 97 %

## 2019-10-29 DIAGNOSIS — E66.9 OBESITY (BMI 30.0-34.9): ICD-10-CM

## 2019-10-29 DIAGNOSIS — Z32.01 POSITIVE URINE PREGNANCY TEST: ICD-10-CM

## 2019-10-29 PROBLEM — E66.01 OBESITY, MORBID, BMI 40.0-49.9 (HCC): Status: RESOLVED | Noted: 2019-01-18 | Resolved: 2019-10-29

## 2019-10-29 PROBLEM — Z23 NEED FOR VACCINATION: Status: RESOLVED | Noted: 2019-01-18 | Resolved: 2019-10-29

## 2019-10-29 LAB
INT CON NEG: NEGATIVE
INT CON POS: POSITIVE
POC URINE PREGNANCY TEST: POSITIVE

## 2019-10-29 PROCEDURE — 99213 OFFICE O/P EST LOW 20 MIN: CPT | Performed by: INTERNAL MEDICINE

## 2019-10-29 PROCEDURE — 81025 URINE PREGNANCY TEST: CPT | Performed by: INTERNAL MEDICINE

## 2019-10-29 ASSESSMENT — PAIN SCALES - GENERAL: PAINLEVEL: NO PAIN

## 2019-10-29 NOTE — PROGRESS NOTES
Soy Zhu is a 29 y.o. female here to establish care and the evaluation and management of:    HPI:     Positive urine pregnancy test   Patient is here for a pregnancy test and medical clearance. 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 She states that she leaves for school on Friday, 11/1, and due to her pregnancy she needs to get physically and medically cleared. She was unable to get an early appointment with her OB and at the earliest has an appointment scheduled on 11/27. LMP was 9/16. Patient has 1 prior pregnancy via normal vaginal delivery 7 years ago. She mentions that her pregnancy went well with no complications. Patient has not taken oral contraceptive in years and stopped the nuvaring. She denies alcohol use, smoking, or recreational drug use. Denies vaginal bleeding or spotting, vaginal discharge, fever, chills, or flank pain.  She reported mild right lower abdominal pain few days ago but it resolved.    Current medicines (including changes today)  Current Outpatient Medications   Medication Sig Dispense Refill   • Prenatal Vit-Fe Sulfate-FA (PRENATAL MULTIVIT-IRON PO) Take  by mouth.     • Prenatal Multivit-Min-Fe-FA (PRENATAL VITAMINS PO) Take  by mouth every day.       No current facility-administered medications for this visit.      She  has a past medical history of Anemia, ASTHMA, and Nose trouble. She also has no past medical history of Diabetes or Seizure (HCC).  She  has no past surgical history on file.  Social History     Tobacco Use   • Smoking status: Never Smoker   • Smokeless tobacco: Never Used   Substance Use Topics   • Alcohol use: No   • Drug use: No     Social History     Social History Narrative   • Not on file     Family History   Problem Relation Age of Onset   • Diabetes Maternal Grandmother         type II   • Diabetes Maternal Grandfather         type II  "  • Heart Disease Maternal Grandfather         stents   • Heart Attack Maternal Grandfather    • Cancer Paternal Grandmother 60        reprodcutive organ   • Heart Attack Paternal Grandfather    • Hyperlipidemia Mother    • Hypertension Mother    • Arthritis Father    • Arthritis Brother      Family Status   Relation Name Status   • MGMo  Alive   • MGFa  Alive   • PGMo  Alive   • PGFa   at age 45        Heart attack   • Mo  Alive   • Fa  Alive   • Yanci  Alive   • Bro  Alive   • Bro  Alive     Health Maintenance Topics with due status: Overdue       Topic Date Due    IMM INFLUENZA 2019       ROS  Gen.: Denied weight change, appetite change, fatigue.  ENT: Denied sinus tenderness, nasal congestion, runny nose, or sore throat  CVS: Denied chest pain, palpitations, legs swelling.  Respiratory: Denied cough, shortness of breath, wheezing.  GI: Denied abdominal pain, constipation or diarrhea.  Endocrine: Denied temperature intolerance, increased frequency of urination, polyphagia or polydipsia.  Musculoskeletal: Denied back pain or joint pain.    All other systems reviewed and are negative     Objective:     /50 (BP Location: Right arm, Patient Position: Sitting, BP Cuff Size: Adult)   Pulse 80   Temp 37 °C (98.6 °F) (Temporal)   Ht 1.516 m (4' 11.69\")   Wt 70.4 kg (155 lb 3.2 oz)   SpO2 97%  Body mass index is 30.63 kg/m².    Physical Exam:  Constitutional: Well nourished and Well developed, Alert, no distress.  Skin: Warm, dry, good turgor, no rashes in visible areas.  Eye: Equal, round and reactive, conjunctiva clear, lids normal.  ENMT: Lips without lesions, good dentition, oropharynx clear.  Neck: Trachea midline, no masses, no thyromegaly. No cervical or supraclavicular lymphadenopathy.  Respiratory: Unlabored respiratory effort, lungs clear to auscultation, no wheezes, no ronchi.  Cardiovascular: Normal S1, S2, no murmur, no edema. No carotid bruits.  Abdomen: Soft, non distended, " non-tender, no masses, no hepatosplenomegaly. Bowel sound normal.  Extremities: No edema, no clubbing, no cyanosis.  Psych: Alert and oriented x3, normal affect and mood.    Results for DREA PARKER (MRN [id number]) as of 10/29/2019 16:36   Ref. Range 10/29/2019 16:27   POC Urine Pregnancy Test Latest Ref Range: Negative  Positive       Assessment and Plan:   The following treatment plan was discussed     1. Positive urine pregnancy test  - Patient is here to confirm her pregnancy and get medical and physical clearance. POCT urine pregnancy is positive in clinic today.   - She is advised to follow-up with her OB for medical clearance on lifting restraints.  Otherwise, she is physically healthy for her school currently.  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  Patient agrees to follow with OB/GYN for that clearance.  - Advised to take prenatal vitamins daily and increase water intake and fiber intake to prevent constipation.  Discussed balanced nutrition and walking exercise as tolerates.  - Patient reported having mild right lower abdominal pain 3 days ago and it resolved and it did not recur.  Patient is advised to seek urgent medical attention or go to ER if she has any abdominal pain or pelvic pain or vaginal spotting or bleeding or discharge.  Patient stated understanding and agree with the plan.  - POCT Pregnancy    2. Obesity (BMI 30.0-34.9)  - Counseled for healthy diet and regular physical exercise to lose weight.  - Patient identified as having weight management issue.  Appropriate orders and counseling given.    Records requested.  Followup: Return if symptoms worsen or fail to improve. sooner should new symptoms or problems arise.      Please note that this dictation  was created using voice recognition software. I have made every reasonable attempt to correct obvious errors, but I expect that there may have unintended errors in text, spelling, punctuation, or grammar that I did not discover.

## 2019-10-30 ENCOUNTER — TELEPHONE (OUTPATIENT)
Dept: OBGYN | Facility: CLINIC | Age: 29
End: 2019-10-30

## 2019-10-30 NOTE — TELEPHONE ENCOUNTER
Pt c/o having  lower back pain and some fever/cold, denies other.  Advised to use Tylenol OTC avoid ibuprofen and increase water intake.    Verbalized understanding

## 2019-11-27 ENCOUNTER — GYNECOLOGY VISIT (OUTPATIENT)
Dept: OBGYN | Facility: CLINIC | Age: 29
End: 2019-11-27
Payer: COMMERCIAL

## 2019-11-27 VITALS — BODY MASS INDEX: 29.41 KG/M2 | WEIGHT: 149 LBS | SYSTOLIC BLOOD PRESSURE: 119 MMHG | DIASTOLIC BLOOD PRESSURE: 73 MMHG

## 2019-11-27 DIAGNOSIS — Z32.01 POSITIVE PREGNANCY TEST: ICD-10-CM

## 2019-11-27 DIAGNOSIS — O21.9 NAUSEA AND VOMITING DURING PREGNANCY: ICD-10-CM

## 2019-11-27 DIAGNOSIS — N91.1 AMENORRHEA, SECONDARY: ICD-10-CM

## 2019-11-27 PROCEDURE — 76830 TRANSVAGINAL US NON-OB: CPT | Performed by: OBSTETRICS & GYNECOLOGY

## 2019-11-27 PROCEDURE — 99203 OFFICE O/P NEW LOW 30 MIN: CPT | Mod: 25 | Performed by: OBSTETRICS & GYNECOLOGY

## 2019-11-27 NOTE — NON-PROVIDER
Pt here for her DUB visit  LMP:09/16/19  Pt had a pregnancy test w/ Positive [facility]  Ph# [phone]  Pharmacy confirmed w/ pt

## 2019-11-27 NOTE — PROGRESS NOTES
Subjective:      Soy Zhu is a 29 y.o. female who presents for Gynecologic Exam            HPI patient is a 29-year-old para 1 who presents today with amenorrhea and positive home pregnancy test.  She reports some intermittent nausea with rare episode of emesis.  She is able to keep most of her food and fluids down daily.  She denies any pelvic pain bleeding or spotting.  Reports normal bowel and bladder functions.  Had history of one spontaneous vaginal delivery.  Patient is currently taking prenatal vitamins    ROS all organ systems were reviewed and are negative except for complaints in HPI       Objective:     /73   Wt 67.6 kg (149 lb)   LMP 09/16/2019 (Approximate)   BMI 29.41 kg/m²      Physical Exam  Vitals signs and nursing note reviewed. Exam conducted with a chaperone present.   Constitutional:       General: She is not in acute distress.     Appearance: Normal appearance. She is obese.   HENT:      Head: Normocephalic and atraumatic.   Eyes:      General:         Right eye: No discharge.         Left eye: No discharge.      Conjunctiva/sclera: Conjunctivae normal.   Neck:      Musculoskeletal: Normal range of motion and neck supple.   Cardiovascular:      Rate and Rhythm: Normal rate and regular rhythm.      Pulses: Normal pulses.      Heart sounds: Normal heart sounds.   Pulmonary:      Effort: Pulmonary effort is normal.      Breath sounds: Normal breath sounds.   Abdominal:      General: Abdomen is flat. There is no distension.      Palpations: Abdomen is soft.      Tenderness: There is no tenderness. There is no guarding.   Genitourinary:     General: Normal vulva.      Vagina: No vaginal discharge.   Musculoskeletal: Normal range of motion.         General: No swelling or deformity.      Right lower leg: No edema.   Skin:     General: Skin is warm.      Coloration: Skin is not jaundiced.      Findings: No bruising or lesion.   Neurological:      General: No focal deficit present.       Mental Status: She is alert and oriented to person, place, and time.   Psychiatric:         Mood and Affect: Mood normal.         Behavior: Behavior normal.         Thought Content: Thought content normal.         Judgment: Judgment normal.            Transvaginal ultrasound was performed and read by me:    Stubbs intrauterine pregnancy noted  Fetal heart rate was 157 bpm  Crown-rump length measurement gives a gestational age of 10 weeks and 5 days with EDC of 6/19/2020  EDC by LMP 6/22/2020  Normal adnexa noted bilaterally  No pelvic free fluid noted    Impression: Normal intrauterine pregnancy at 10 weeks and 5 days gestation with EDC of 6/22/2020     Assessment/Plan:       1. Amenorrhea, secondary  29-year-old G2, P1 presented with amenorrhea and positive home pregnancy test.  Normal intrauterine pregnancy confirmed by ultrasound today at 10 weeks and 5 days gestation.  Findings were discussed  Pregnancy precautions and restrictions were reviewed    2. Positive pregnancy test      3. Nausea and vomiting during pregnancy  We discussed nausea and vomiting in pregnancy and treatment options including dietary modification.  Patient will try dietary modification since her symptoms are mild    Precautions and plan of care were reviewed    Patient to follow-up in 1 to 2 weeks for new OB

## 2019-12-11 ENCOUNTER — HOSPITAL ENCOUNTER (OUTPATIENT)
Facility: MEDICAL CENTER | Age: 29
End: 2019-12-11
Attending: NURSE PRACTITIONER
Payer: COMMERCIAL

## 2019-12-11 ENCOUNTER — INITIAL PRENATAL (OUTPATIENT)
Dept: OBGYN | Facility: CLINIC | Age: 29
End: 2019-12-11
Payer: COMMERCIAL

## 2019-12-11 VITALS — DIASTOLIC BLOOD PRESSURE: 73 MMHG | SYSTOLIC BLOOD PRESSURE: 117 MMHG | WEIGHT: 153 LBS | BODY MASS INDEX: 30.2 KG/M2

## 2019-12-11 DIAGNOSIS — Z34.81 ENCOUNTER FOR SUPERVISION OF OTHER NORMAL PREGNANCY, FIRST TRIMESTER: ICD-10-CM

## 2019-12-11 DIAGNOSIS — Z34.81 ENCOUNTER FOR SUPERVISION OF OTHER NORMAL PREGNANCY, FIRST TRIMESTER: Primary | ICD-10-CM

## 2019-12-11 PROCEDURE — 87491 CHLMYD TRACH DNA AMP PROBE: CPT

## 2019-12-11 PROCEDURE — 88175 CYTOPATH C/V AUTO FLUID REDO: CPT

## 2019-12-11 PROCEDURE — 87591 N.GONORRHOEAE DNA AMP PROB: CPT

## 2019-12-11 PROCEDURE — 59401 PR NEW OB VISIT: CPT | Performed by: NURSE PRACTITIONER

## 2019-12-11 NOTE — LETTER
Cystic Fibrosis Carrier Testing  Soy Zhu    The following information is about a blood test that can be done to determine if you and/or your partner carry the gene for cystic fibrosis.    WHAT IS CYSTIC FIBROSIS?  · Cystic fibrosis (CF) is an inherited disease that affects more than 25,000 [place] children and young adults.  · Symptoms of CF vary but include lung congestion, pneumonia, diarrhea and poor growth.  Most people with CF have severe medical problems and some die at a young age.  Others have so few symptoms they are unaware they have CF.  · CF does not affect intelligence.  · Although there is no cure for CF at this time, scientists are making progress in improving treatment and in searching for a cure.  In the past many people with CF  at a very young age.  Today, many are living into their 20’s and 30’s.    IS THERE A CHANCE MY BABY COULD HAVE CYSTIC FIBROSIS?  · You can have a child with CF even if there is no history in your family (see chart below).  · CF testing can help determine if you are a carrier and at risk to have a child with CF.  Note: if both parents are carriers, there is a 1 in 4 (25%) chance with each pregnancy that they will have a child with CF.  · Carriers have one normal CF gene and one altered CF gene.  · People with CF have two altered CF genes.  · Most people have two normal copies of the CF gene.    Approximate risk that a couple with no family history of cystic fibrosis will have a child with cystic fibrosis:    Ethnic background / Risk     couple:  1 in 2,500   couple:  1 in 15,000            couple:  1 in 8,000     American couple:  1 in 32,000     WHAT TESTING IS AVAILABLE?  · There is a blood test that can be done to find out if you or your partner is a carrier.  · It is important to understand that CF carrier testing does not detect all CF carriers.  · If the test shows that you are both CF carriers, you unborn baby can  be tested to find out if the baby has CF.    HOW MUCH DOES IT COST TO HAVE CYSTIC FIBROSIS CARRIER TESTING?  · Cost and insurance coverage for CF carrier testing vary depending upon the laboratory used and your insurance policy.  · The average cost for CF carrier testing is $300 per person.  · Your genetic counselor can provide you with more information about cystic fibrosis carrier testing.    _____  Yes, I am interested in discussing carrier testing with a genetic counselor.    _____  No, I am not interested in CF carrier testing or in receiving more information about CF carrier testing.      Client signature: ________________________________________  12/11/2019

## 2019-12-11 NOTE — PROGRESS NOTES
Pt. Here for NOB visit today.  # [phone]  First prenatal care  Pt. States nausea and dry heaving   Pharmacy verified.   Pt desires AFP   Pt declines CF   Flu vaccine offered and declined.   Chaperone offered and provided

## 2019-12-11 NOTE — PROGRESS NOTES
S:  Soy Zhu is a 29 y.o.  who presents for her new OB exam.  She is 12w2d with and ISAIAS of Estimated Date of Delivery: 20 by  ultrasound. She has no complaints except for nausea with dry heaving.  She is currently working at an office. No heavy lifting or chemical exposure. No ER visits or previous care in this pregnancy.     desires AFP.  declines CF.  Denies VB, LOF, or cramping.  Denies dysuria, vaginal DC. Reports not yet feeling fetal movement.      Pregnancy is desired.      Past Medical History:   Diagnosis Date   • Anemia    • ASTHMA     exercise-induced asthma   • Nose trouble     nose bleeds     Family History   Problem Relation Age of Onset   • Diabetes Maternal Grandmother         type II   • Diabetes Maternal Grandfather         type II   • Heart Disease Maternal Grandfather         stents   • Heart Attack Maternal Grandfather    • Cancer Paternal Grandmother 60        reprodcutive organ   • Heart Attack Paternal Grandfather    • Hyperlipidemia Mother    • Hypertension Mother    • Arthritis Father    • Arthritis Brother      Social History     Socioeconomic History   • Marital status:      Spouse name: Not on file   • Number of children: Not on file   • Years of education: Not on file   • Highest education level: Not on file   Occupational History   • Not on file   Social Needs   • Financial resource strain: Not on file   • Food insecurity:     Worry: Not on file     Inability: Not on file   • Transportation needs:     Medical: Not on file     Non-medical: Not on file   Tobacco Use   • Smoking status: Never Smoker   • Smokeless tobacco: Never Used   Substance and Sexual Activity   • Alcohol use: No   • Drug use: No   • Sexual activity: Yes     Partners: Male     Birth control/protection: None   Lifestyle   • Physical activity:     Days per week: Not on file     Minutes per session: Not on file   • Stress: Not on file   Relationships   • Social connections:     Talks on  phone: Not on file     Gets together: Not on file     Attends Gnosticism service: Not on file     Active member of club or organization: Not on file     Attends meetings of clubs or organizations: Not on file     Relationship status: Not on file   • Intimate partner violence:     Fear of current or ex partner: Not on file     Emotionally abused: Not on file     Physically abused: Not on file     Forced sexual activity: Not on file   Other Topics Concern   • Not on file   Social History Narrative   • Not on file     OB History    Para Term  AB Living   2 1 1     1   SAB TAB Ectopic Molar Multiple Live Births             1      # Outcome Date GA Lbr Rey/2nd Weight Sex Delivery Anes PTL Lv   2 Current            1 Term 10/14/12 38w0d  3.033 kg (6 lb 11 oz) F Vag-Spont None  NASREEN      Birth Comments: System Generated. Please review and update pregnancy details.         History of HSV I or II in self or partner: no  History of Thyroid problems: no    O:    Vitals:    19 1404   BP: 117/73   Weight: 69.4 kg (153 lb)      See Prenatal Physical flow sheet for this visit.    Wet mount: none      A:   1.  IUP @ 12w2d per  ultrasound        2.  S=D        3.  See problem list below               Patient Active Problem List    Diagnosis Date Noted   • Positive urine pregnancy test 10/29/2019   • School physical exam 2019   • Ventral hernia without obstruction or gangrene 2018         P:  1.  GC/CT & pap done        2.  Prenatal labs ordered - lab slip given. Too early for AFP        3.  Discussed PNV, diet, avoidances and adequate water intake        4.  NOB packet given        5.  Return to office in 4 wks        6.  Complete OB US in 6 wks        7.  Nausea relief measures discussed. She declines nausea medication at this time.     Orders Placed This Encounter   • US-OB 2ND 3RD TRI COMPLETE   • PRENATAL PANEL 3+HIV+HCV   • THINPREP RFLX HPV ASCUS W/CTNG   • URINE DRUG SCREEN W/CONF (AR)

## 2019-12-12 LAB
C TRACH DNA GENITAL QL NAA+PROBE: NEGATIVE
CYTOLOGY REG CYTOL: NORMAL
N GONORRHOEA DNA GENITAL QL NAA+PROBE: NEGATIVE
SPECIMEN SOURCE: NORMAL

## 2020-01-02 ENCOUNTER — HOSPITAL ENCOUNTER (OUTPATIENT)
Dept: LAB | Facility: MEDICAL CENTER | Age: 30
End: 2020-01-02
Attending: NURSE PRACTITIONER
Payer: COMMERCIAL

## 2020-01-02 DIAGNOSIS — Z34.81 ENCOUNTER FOR SUPERVISION OF OTHER NORMAL PREGNANCY, FIRST TRIMESTER: ICD-10-CM

## 2020-01-02 LAB
ABO GROUP BLD: NORMAL
BASOPHILS # BLD AUTO: 0.4 % (ref 0–1.8)
BASOPHILS # BLD: 0.04 K/UL (ref 0–0.12)
BLD GP AB SCN SERPL QL: NORMAL
EOSINOPHIL # BLD AUTO: 0.21 K/UL (ref 0–0.51)
EOSINOPHIL NFR BLD: 2.3 % (ref 0–6.9)
ERYTHROCYTE [DISTWIDTH] IN BLOOD BY AUTOMATED COUNT: 43.9 FL (ref 35.9–50)
HBV SURFACE AG SER QL: NEGATIVE
HCT VFR BLD AUTO: 35.9 % (ref 37–47)
HCV AB SER QL: NEGATIVE
HGB BLD-MCNC: 12 G/DL (ref 12–16)
HIV 1+2 AB+HIV1 P24 AG SERPL QL IA: NON REACTIVE
IMM GRANULOCYTES # BLD AUTO: 0.04 K/UL (ref 0–0.11)
IMM GRANULOCYTES NFR BLD AUTO: 0.4 % (ref 0–0.9)
LYMPHOCYTES # BLD AUTO: 1.67 K/UL (ref 1–4.8)
LYMPHOCYTES NFR BLD: 18.3 % (ref 22–41)
MCH RBC QN AUTO: 30.2 PG (ref 27–33)
MCHC RBC AUTO-ENTMCNC: 33.4 G/DL (ref 33.6–35)
MCV RBC AUTO: 90.2 FL (ref 81.4–97.8)
MONOCYTES # BLD AUTO: 0.57 K/UL (ref 0–0.85)
MONOCYTES NFR BLD AUTO: 6.2 % (ref 0–13.4)
NEUTROPHILS # BLD AUTO: 6.62 K/UL (ref 2–7.15)
NEUTROPHILS NFR BLD: 72.4 % (ref 44–72)
NRBC # BLD AUTO: 0 K/UL
NRBC BLD-RTO: 0 /100 WBC
PLATELET # BLD AUTO: 152 K/UL (ref 164–446)
PMV BLD AUTO: 12 FL (ref 9–12.9)
RBC # BLD AUTO: 3.98 M/UL (ref 4.2–5.4)
RH BLD: NORMAL
RUBV AB SER QL: 310.6 IU/ML
TREPONEMA PALLIDUM IGG+IGM AB [PRESENCE] IN SERUM OR PLASMA BY IMMUNOASSAY: NON REACTIVE
WBC # BLD AUTO: 9.2 K/UL (ref 4.8–10.8)

## 2020-01-02 PROCEDURE — 86780 TREPONEMA PALLIDUM: CPT

## 2020-01-02 PROCEDURE — 86900 BLOOD TYPING SEROLOGIC ABO: CPT

## 2020-01-02 PROCEDURE — 86762 RUBELLA ANTIBODY: CPT

## 2020-01-02 PROCEDURE — 87389 HIV-1 AG W/HIV-1&-2 AB AG IA: CPT

## 2020-01-02 PROCEDURE — 80307 DRUG TEST PRSMV CHEM ANLYZR: CPT

## 2020-01-02 PROCEDURE — 36415 COLL VENOUS BLD VENIPUNCTURE: CPT

## 2020-01-02 PROCEDURE — 87340 HEPATITIS B SURFACE AG IA: CPT

## 2020-01-02 PROCEDURE — 86850 RBC ANTIBODY SCREEN: CPT

## 2020-01-02 PROCEDURE — 86803 HEPATITIS C AB TEST: CPT

## 2020-01-02 PROCEDURE — 85025 COMPLETE CBC W/AUTO DIFF WBC: CPT

## 2020-01-02 PROCEDURE — 86901 BLOOD TYPING SEROLOGIC RH(D): CPT

## 2020-01-04 LAB
AMPHET CTO UR CFM-MCNC: NEGATIVE NG/ML
BARBITURATES CTO UR CFM-MCNC: NEGATIVE NG/ML
BENZODIAZ CTO UR CFM-MCNC: NEGATIVE NG/ML
CANNABINOIDS CTO UR CFM-MCNC: NEGATIVE NG/ML
COCAINE CTO UR CFM-MCNC: NEGATIVE NG/ML
DRUG COMMENT 753798: NORMAL
METHADONE CTO UR CFM-MCNC: NEGATIVE NG/ML
OPIATES CTO UR CFM-MCNC: NEGATIVE NG/ML
PCP CTO UR CFM-MCNC: NEGATIVE NG/ML
PROPOXYPH CTO UR CFM-MCNC: NEGATIVE NG/ML

## 2020-01-08 ENCOUNTER — ROUTINE PRENATAL (OUTPATIENT)
Dept: OBGYN | Facility: CLINIC | Age: 30
End: 2020-01-08
Payer: COMMERCIAL

## 2020-01-08 VITALS — DIASTOLIC BLOOD PRESSURE: 86 MMHG | BODY MASS INDEX: 30.79 KG/M2 | SYSTOLIC BLOOD PRESSURE: 132 MMHG | WEIGHT: 156 LBS

## 2020-01-08 DIAGNOSIS — Z34.81 ENCOUNTER FOR SUPERVISION OF OTHER NORMAL PREGNANCY, FIRST TRIMESTER: ICD-10-CM

## 2020-01-08 PROCEDURE — 90040 PR PRENATAL FOLLOW UP: CPT | Performed by: OBSTETRICS & GYNECOLOGY

## 2020-01-08 NOTE — PROGRESS NOTES
OB Followup;    16w2d    Patient Active Problem List    Diagnosis Date Noted   • Positive urine pregnancy test 10/29/2019   • School physical exam 2019   • Ventral hernia without obstruction or gangrene 2018       Vitals:    20 1419   BP: 132/86   Weight: 70.8 kg (156 lb)       Patient presents for followup of OB care. Currently doing well . Good fetal movement no leakage of fluid no contractions or vaginal bleeding        Size equals dates, normal fetal heart rate      Labs; AFP tetra today    Labor precautions given  Increase oral hydration  Discussed proper weight gain during pregnancy  Continue prenatal vitamins  Signs and symptoms of labor/ labor discussed     Followup in  4 weeks

## 2020-01-08 NOTE — PROGRESS NOTES
Pt here today for OB follow up  Pt states doing well  Reports +FM  Good # [phone]  Pharmacy Confirmed.  Desires AFP

## 2020-02-05 ENCOUNTER — HOSPITAL ENCOUNTER (OUTPATIENT)
Dept: RADIOLOGY | Facility: MEDICAL CENTER | Age: 30
End: 2020-02-05
Attending: NURSE PRACTITIONER
Payer: COMMERCIAL

## 2020-02-05 DIAGNOSIS — Z34.81 ENCOUNTER FOR SUPERVISION OF OTHER NORMAL PREGNANCY, FIRST TRIMESTER: ICD-10-CM

## 2020-02-05 PROCEDURE — 76805 OB US >/= 14 WKS SNGL FETUS: CPT

## 2020-02-07 ENCOUNTER — APPOINTMENT (OUTPATIENT)
Dept: LAB | Facility: MEDICAL CENTER | Age: 30
End: 2020-02-07
Attending: OBSTETRICS & GYNECOLOGY
Payer: COMMERCIAL

## 2020-02-07 PROCEDURE — 81511 FTL CGEN ABNOR FOUR ANAL: CPT

## 2020-02-09 LAB
# FETUSES US: NORMAL
AFP MOM SERPL: 1.31
AFP SERPL-MCNC: 79 NG/ML
AGE - REPORTED: 30.2 YR
CURRENT SMOKER: NO
FAMILY MEMBER DISEASES HX: NO
GA METHOD: NORMAL
GA: NORMAL WK
HCG MOM SERPL: 1.59
HCG SERPL-ACNC: NORMAL IU/L
HX OF HEREDITARY DISORDERS: NO
IDDM PATIENT QL: NO
INHIBIN A MOM SERPL: 1.3
INHIBIN A SERPL-MCNC: 239 PG/ML
INTEGRATED SCN PATIENT-IMP: NORMAL
PATHOLOGY STUDY: NORMAL
SPECIMEN DRAWN SERPL: NORMAL
U ESTRIOL MOM SERPL: 1.02
U ESTRIOL SERPL-MCNC: 2.66 NG/ML

## 2020-02-11 ENCOUNTER — ROUTINE PRENATAL (OUTPATIENT)
Dept: OBGYN | Facility: CLINIC | Age: 30
End: 2020-02-11
Payer: COMMERCIAL

## 2020-02-11 VITALS — WEIGHT: 166 LBS | BODY MASS INDEX: 32.76 KG/M2 | DIASTOLIC BLOOD PRESSURE: 65 MMHG | SYSTOLIC BLOOD PRESSURE: 115 MMHG

## 2020-02-11 DIAGNOSIS — Z34.81 ENCOUNTER FOR SUPERVISION OF OTHER NORMAL PREGNANCY, FIRST TRIMESTER: ICD-10-CM

## 2020-02-11 PROCEDURE — 90040 PR PRENATAL FOLLOW UP: CPT | Performed by: OBSTETRICS & GYNECOLOGY

## 2020-02-11 NOTE — PROGRESS NOTES
Pt here today for OB follow up  Pt states no complaints   Reports +  Good # [phone]  Pharmacy Confirmed.  Chaperone offered and provided.

## 2020-02-11 NOTE — PROGRESS NOTES
OB Followup;    21w1d    Patient Active Problem List    Diagnosis Date Noted   • Positive urine pregnancy test 10/29/2019   • School physical exam 2019   • Ventral hernia without obstruction or gangrene 2018       Vitals:    20 1353   BP: 115/65   Weight: 75.3 kg (166 lb)       Patient presents for followup of OB care. Currently doing well . Good fetal movement no leakage of fluid no contractions or vaginal bleeding        Size equals dates, normal fetal heart rate      Labs; AFP tetra normal    Labor precautions given  Increase oral hydration  Discussed proper weight gain during pregnancy  Continue prenatal vitamins  Signs and symptoms of labor/ labor discussed     Followup in  4 weeks

## 2020-03-10 ENCOUNTER — ROUTINE PRENATAL (OUTPATIENT)
Dept: OBGYN | Facility: CLINIC | Age: 30
End: 2020-03-10
Payer: COMMERCIAL

## 2020-03-10 VITALS — WEIGHT: 173 LBS | DIASTOLIC BLOOD PRESSURE: 67 MMHG | BODY MASS INDEX: 34.14 KG/M2 | SYSTOLIC BLOOD PRESSURE: 113 MMHG

## 2020-03-10 DIAGNOSIS — Z3A.25 25 WEEKS GESTATION OF PREGNANCY: ICD-10-CM

## 2020-03-10 PROBLEM — Z32.01 POSITIVE URINE PREGNANCY TEST: Status: RESOLVED | Noted: 2019-10-29 | Resolved: 2020-03-10

## 2020-03-10 PROCEDURE — 90040 PR PRENATAL FOLLOW UP: CPT | Performed by: OBSTETRICS & GYNECOLOGY

## 2020-03-10 ASSESSMENT — FIBROSIS 4 INDEX: FIB4 SCORE: 1.05

## 2020-03-10 NOTE — PROGRESS NOTES
Pt here today for OB follow up  Pt states no complaints   Reports +  Good # [phone]  Pharmacy Confirmed.  Chaperone offered and provided.

## 2020-03-10 NOTE — PROGRESS NOTES
ERICK:  25w1d    Pt reports doing well.  Reports +FM, Denies vaginal bleeding, contractions, LOF.    /67   Wt 78.5 kg (173 lb)   LMP 2019 (Approximate)   BMI 34.14 kg/m²   gen: AAO, NAD  FHTs: 145  FH: 25    A/P: 29 y.o.  @ 25w1d    #PNL up to date, Rh+; anatomy US WNL  --CBC/glucose screening/RPR ordered  #Plans to BF - classes discussed  #Discussed chosing a pediatrician  #Travel.  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  Paperwork is filled out today for her to go.  Discussed precautions with air travel.    RTC 4wks

## 2020-03-11 ENCOUNTER — HOSPITAL ENCOUNTER (OUTPATIENT)
Dept: LAB | Facility: MEDICAL CENTER | Age: 30
End: 2020-03-11
Attending: OBSTETRICS & GYNECOLOGY
Payer: COMMERCIAL

## 2020-03-11 DIAGNOSIS — Z3A.25 25 WEEKS GESTATION OF PREGNANCY: ICD-10-CM

## 2020-03-11 LAB
ERYTHROCYTE [DISTWIDTH] IN BLOOD BY AUTOMATED COUNT: 44.8 FL (ref 35.9–50)
GLUCOSE 1H P 50 G GLC PO SERPL-MCNC: 126 MG/DL (ref 70–139)
HCT VFR BLD AUTO: 33.8 % (ref 37–47)
HGB BLD-MCNC: 11.2 G/DL (ref 12–16)
MCH RBC QN AUTO: 30.9 PG (ref 27–33)
MCHC RBC AUTO-ENTMCNC: 33.1 G/DL (ref 33.6–35)
MCV RBC AUTO: 93.1 FL (ref 81.4–97.8)
PLATELET # BLD AUTO: 137 K/UL (ref 164–446)
PMV BLD AUTO: 12.1 FL (ref 9–12.9)
RBC # BLD AUTO: 3.63 M/UL (ref 4.2–5.4)
TREPONEMA PALLIDUM IGG+IGM AB [PRESENCE] IN SERUM OR PLASMA BY IMMUNOASSAY: NORMAL
WBC # BLD AUTO: 11.1 K/UL (ref 4.8–10.8)

## 2020-03-11 PROCEDURE — 36415 COLL VENOUS BLD VENIPUNCTURE: CPT

## 2020-03-11 PROCEDURE — 85027 COMPLETE CBC AUTOMATED: CPT

## 2020-03-11 PROCEDURE — 86780 TREPONEMA PALLIDUM: CPT

## 2020-03-11 PROCEDURE — 82950 GLUCOSE TEST: CPT

## 2020-04-13 ENCOUNTER — ROUTINE PRENATAL (OUTPATIENT)
Dept: OBGYN | Facility: CLINIC | Age: 30
End: 2020-04-13
Payer: COMMERCIAL

## 2020-04-13 VITALS — BODY MASS INDEX: 36.12 KG/M2 | SYSTOLIC BLOOD PRESSURE: 111 MMHG | DIASTOLIC BLOOD PRESSURE: 72 MMHG | WEIGHT: 183 LBS

## 2020-04-13 DIAGNOSIS — Z34.93 THIRD TRIMESTER PREGNANCY: ICD-10-CM

## 2020-04-13 PROCEDURE — 90040 PR PRENATAL FOLLOW UP: CPT | Performed by: OBSTETRICS & GYNECOLOGY

## 2020-04-13 ASSESSMENT — FIBROSIS 4 INDEX: FIB4 SCORE: 1.2

## 2020-04-13 NOTE — LETTER
"Count Your Baby's Movements  Another step to a healthy delivery    A Epic Dress Re Test             Dept: [phone]    How Many Weeks Pregnant? 30w0d    Date to Begin Countin20              How to use this chart    One way for your physician to keep track of your baby's health is by knowing how often the baby moves (or \"kicks\") in your womb.  You can help your physician to do this by using this chart every day.    Every day, you should see how many hours it takes for your baby to move 10 times.  Start in the morning, as soon as you get up.    · First, write down the time your baby moves until you get to 10.  · Check off one box every time your baby moves until you get to 10.  · Write down the time you finished counting in the last column.  · Total how long it took to count up all 10 movements.  · Finally, fill in the box that shows how long this took.  After counting 10 movements, you no longer have to count any more that day.  The next morning, just start counting again as soon as you get up.    What should you call a \"movement\"?  It is hard to say, because it will feel different from one mother to another and from one pregnancy to the next.  The important thing is that you count the movements the same way throughout your pregnancy.  If you have more questions, you should ask your physician.    Count carefully every day!  SAMPLE:  Week 28    How many hours did it take to feel 10 movements?       Start  Time     1     2     3     4     5     6     7     8     9     10   Finish Time   Mon 8:20 ·  ·  ·  ·  ·  ·  ·  ·  ·  ·  11:40                  Sat               Sun                 IMPORTANT: You should contact your physician if it takes more than two hours for you to feel 10 movements.  Each morning, write down the time and start to count the movements of your baby.  Keep track by checking off one box every time you feel one movement.  When you " "have felt 10 \"kicks\", write down the time you finished counting in the last column.  Then fill in the   box (over the check qian) for the number of hours it took.  Be sure to read the complete instructions on the previous page.            "

## 2020-04-13 NOTE — PROGRESS NOTES
Pt here today for OB follow up  Pt states no complaints   Reports +FM  Good # [phone]  Pharmacy Confirmed w/ pt   T-dap Declined

## 2020-04-13 NOTE — PROGRESS NOTES
S: Pt presents for routine OB follow up. Good fetal movement.  No contractions, vaginal bleeding, or leakage of fluid.    Questions answered.    O: LMP 2019 (Approximate)   Patients' weight gain, fluid intake and exercise level discussed.  Vitals, fundal height , fetal position, and FHR reviewed on flowsheet    Lab:No results found for this or any previous visit (from the past 336 hour(s)).    A/P:  30 y.o.  at 30w0d presents for routine obstetric follow-up.  Size equals dates and/or scan    1.  Continue prenatal vitamins.  2.  Fetal kick counts.  3.  Exercise at least 30 minutes daily.  4.  Drink at least 2L of water daily  5.  Labor precautions educated.  6.  Follow-up in 3 weeks.  7.  Gestational thrombocytopenia- mild. Recommend repeat CBC at 36 weeks with GBS.

## 2020-04-28 ENCOUNTER — ROUTINE PRENATAL (OUTPATIENT)
Dept: OBGYN | Facility: CLINIC | Age: 30
End: 2020-04-28
Payer: COMMERCIAL

## 2020-04-28 VITALS — BODY MASS INDEX: 36.51 KG/M2 | DIASTOLIC BLOOD PRESSURE: 84 MMHG | SYSTOLIC BLOOD PRESSURE: 133 MMHG | WEIGHT: 185 LBS

## 2020-04-28 DIAGNOSIS — Z34.81 ENCOUNTER FOR SUPERVISION OF OTHER NORMAL PREGNANCY, FIRST TRIMESTER: ICD-10-CM

## 2020-04-28 PROCEDURE — 90040 PR PRENATAL FOLLOW UP: CPT | Performed by: OBSTETRICS & GYNECOLOGY

## 2020-04-28 ASSESSMENT — FIBROSIS 4 INDEX: FIB4 SCORE: 1.2

## 2020-04-28 NOTE — PROGRESS NOTES
Pt here today for OB follow up  Pt reports no issues at this moment   Reports +FM  Good # [phone]  Pharmacy Confirmed w/ pt

## 2020-04-28 NOTE — PROGRESS NOTES
OB Followup;    32w1d    Patient Active Problem List    Diagnosis Date Noted   • School physical exam 2019   • Ventral hernia without obstruction or gangrene 2018       Vitals:    20 1056   BP: 133/84   Weight: 83.9 kg (185 lb)       Patient presents for followup of OB care. Currently doing well . Good fetal movement no leakage of fluid no contractions or vaginal bleeding        Size equals dates, normal fetal heart rate          Labor precautions given  Increase oral hydration  Discussed proper weight gain during pregnancy  Continue prenatal vitamins  Signs and symptoms of labor/ labor discussed     Followup in  2 weeks

## 2020-05-12 ENCOUNTER — ROUTINE PRENATAL (OUTPATIENT)
Dept: OBGYN | Facility: CLINIC | Age: 30
End: 2020-05-12
Payer: COMMERCIAL

## 2020-05-12 VITALS — DIASTOLIC BLOOD PRESSURE: 70 MMHG | BODY MASS INDEX: 36.71 KG/M2 | SYSTOLIC BLOOD PRESSURE: 106 MMHG | WEIGHT: 186 LBS

## 2020-05-12 DIAGNOSIS — D69.6 BENIGN GESTATIONAL THROMBOCYTOPENIA IN THIRD TRIMESTER (HCC): ICD-10-CM

## 2020-05-12 DIAGNOSIS — O99.113 BENIGN GESTATIONAL THROMBOCYTOPENIA IN THIRD TRIMESTER (HCC): ICD-10-CM

## 2020-05-12 DIAGNOSIS — Z34.93 THIRD TRIMESTER PREGNANCY: ICD-10-CM

## 2020-05-12 PROCEDURE — 90040 PR PRENATAL FOLLOW UP: CPT | Performed by: OBSTETRICS & GYNECOLOGY

## 2020-05-12 ASSESSMENT — FIBROSIS 4 INDEX: FIB4 SCORE: 1.2

## 2020-05-12 NOTE — PROGRESS NOTES
Pt here today for OB follow up @ 34w1d  Pt states denies VB & LOF   Reports +FM  Good # [phone]   Pharmacy Confirmed.

## 2020-05-12 NOTE — PROGRESS NOTES
S: Pt presents for routine OB follow up. Good fetal movement.  No contractions, vaginal bleeding, or leakage of fluid.    Questions answered.    O: /70   Wt 84.4 kg (186 lb)   LMP 2019 (Approximate)   BMI 36.71 kg/m²   Patients' weight gain, fluid intake and exercise level discussed.  Vitals, fundal height , fetal position, and FHR reviewed on flowsheet    Lab:No results found for this or any previous visit (from the past 336 hour(s)).    A/P:  30 y.o.  at 34w1d presents for routine obstetric follow-up.  Size equals dates and/or scan    1.  Continue prenatal vitamins.  2.  Fetal kick counts.  3.  Exercise at least 30 minutes daily.  4.  Drink at least 2L of water daily  5.  Labor precautions educated.  6.  Follow-up in 2 weeks.  7.  GBS next visit  8.  Gestational thrombocytopenia- Order repeat CBC next appointment

## 2020-05-27 ENCOUNTER — ROUTINE PRENATAL (OUTPATIENT)
Dept: OBGYN | Facility: CLINIC | Age: 30
End: 2020-05-27
Payer: COMMERCIAL

## 2020-05-27 ENCOUNTER — HOSPITAL ENCOUNTER (OUTPATIENT)
Facility: MEDICAL CENTER | Age: 30
End: 2020-05-27
Attending: OBSTETRICS & GYNECOLOGY
Payer: COMMERCIAL

## 2020-05-27 ENCOUNTER — HOSPITAL ENCOUNTER (OUTPATIENT)
Dept: LAB | Facility: MEDICAL CENTER | Age: 30
End: 2020-05-27
Attending: OBSTETRICS & GYNECOLOGY
Payer: COMMERCIAL

## 2020-05-27 VITALS — BODY MASS INDEX: 37.3 KG/M2 | WEIGHT: 189 LBS | DIASTOLIC BLOOD PRESSURE: 69 MMHG | SYSTOLIC BLOOD PRESSURE: 124 MMHG

## 2020-05-27 DIAGNOSIS — O99.119 THROMBOCYTOPENIA AFFECTING PREGNANCY (HCC): ICD-10-CM

## 2020-05-27 DIAGNOSIS — D69.6 THROMBOCYTOPENIA AFFECTING PREGNANCY (HCC): ICD-10-CM

## 2020-05-27 DIAGNOSIS — Z34.90 PREGNANCY, UNSPECIFIED GESTATIONAL AGE: ICD-10-CM

## 2020-05-27 DIAGNOSIS — K43.9 VENTRAL HERNIA WITHOUT OBSTRUCTION OR GANGRENE: ICD-10-CM

## 2020-05-27 PROBLEM — O99.113 BENIGN GESTATIONAL THROMBOCYTOPENIA IN THIRD TRIMESTER (HCC): Status: RESOLVED | Noted: 2020-05-12 | Resolved: 2020-05-27

## 2020-05-27 LAB
ERYTHROCYTE [DISTWIDTH] IN BLOOD BY AUTOMATED COUNT: 45 FL (ref 35.9–50)
HCT VFR BLD AUTO: 35.7 % (ref 37–47)
HGB BLD-MCNC: 11.2 G/DL (ref 12–16)
MCH RBC QN AUTO: 29.2 PG (ref 27–33)
MCHC RBC AUTO-ENTMCNC: 31.4 G/DL (ref 33.6–35)
MCV RBC AUTO: 93.2 FL (ref 81.4–97.8)
PLATELET # BLD AUTO: 131 K/UL (ref 164–446)
PMV BLD AUTO: 11.8 FL (ref 9–12.9)
RBC # BLD AUTO: 3.83 M/UL (ref 4.2–5.4)
WBC # BLD AUTO: 10.8 K/UL (ref 4.8–10.8)

## 2020-05-27 PROCEDURE — 87081 CULTURE SCREEN ONLY: CPT

## 2020-05-27 PROCEDURE — 36415 COLL VENOUS BLD VENIPUNCTURE: CPT

## 2020-05-27 PROCEDURE — 90040 PR PRENATAL FOLLOW UP: CPT | Performed by: OBSTETRICS & GYNECOLOGY

## 2020-05-27 PROCEDURE — 87150 DNA/RNA AMPLIFIED PROBE: CPT

## 2020-05-27 PROCEDURE — 85027 COMPLETE CBC AUTOMATED: CPT

## 2020-05-27 ASSESSMENT — FIBROSIS 4 INDEX: FIB4 SCORE: 1.2

## 2020-05-27 NOTE — PROGRESS NOTES
ERICK:  36w2d    Pt reports doing well.  Denies vaginal bleeding, contractions, LOF.  Reports +FM.    /69   Wt 85.7 kg (189 lb)   LMP 2019 (Approximate)   BMI 37.30 kg/m²   gen: AAO, NAD  FHTs: 145  FH: 36  GBS collected    A/P:   29yo  dated by LMP  Problems:  --thrombocytopenia (likely gestational) -- repeat CBC ordered   O+, RI, RPR neg, Hep B neg, HIV neg  Genetic screen: neg  1 hr GTT WNL  Gestational thrombocytopenia mild (132) [ ] repeat ordered   Anatomy scan normal  --GBS collected   Delivery plan: spon labor - didn't have epidural last delivery so will try again without - aware that if plts become very low may pose issue with spinal  PP planning: pediatrician - [facility],  BC unsure, plans to BF

## 2020-05-27 NOTE — PROGRESS NOTES
Pt here today for OB follow up  Pt states no issues   Reports +  Good # [phone]  Pharmacy Confirmed w. Pt

## 2020-05-28 LAB — GP B STREP DNA SPEC QL NAA+PROBE: NEGATIVE

## 2020-06-03 ENCOUNTER — ROUTINE PRENATAL (OUTPATIENT)
Dept: OBGYN | Facility: CLINIC | Age: 30
End: 2020-06-03
Payer: COMMERCIAL

## 2020-06-03 VITALS — WEIGHT: 189.4 LBS | BODY MASS INDEX: 37.38 KG/M2 | DIASTOLIC BLOOD PRESSURE: 79 MMHG | SYSTOLIC BLOOD PRESSURE: 120 MMHG

## 2020-06-03 DIAGNOSIS — O99.113 BENIGN GESTATIONAL THROMBOCYTOPENIA IN THIRD TRIMESTER (HCC): ICD-10-CM

## 2020-06-03 DIAGNOSIS — D69.6 BENIGN GESTATIONAL THROMBOCYTOPENIA IN THIRD TRIMESTER (HCC): ICD-10-CM

## 2020-06-03 PROCEDURE — 90040 PR PRENATAL FOLLOW UP: CPT | Performed by: OBSTETRICS & GYNECOLOGY

## 2020-06-03 ASSESSMENT — FIBROSIS 4 INDEX: FIB4 SCORE: 1.26

## 2020-06-03 NOTE — PROGRESS NOTES
Pt's here for OB follow-up  Reports + fetal movement  No VB, LOF or UC's.  Good Phone #: [phone]  Pharmacy verified.  Pt states no other complaints for today.  GBS negative, Pt notified.

## 2020-06-03 NOTE — PROGRESS NOTES
S: Pt presents for routine OB follow up.  No contractions, vaginal bleeding, or leaking fluids. Good fetal movement.      O: /79   Wt 85.9 kg (189 lb 6.4 oz)   LMP 2019 (Approximate)   BMI 37.38 kg/m²   Vitals:    20 0816   BP: 120/79   Weight: 85.9 kg (189 lb 6.4 oz)     Vitals, fundal height , fetal position, and FHR reviewed on flowsheet    Patient Active Problem List   Diagnosis   • Ventral hernia without obstruction or gangrene   • School physical exam   • Benign gestational thrombocytopenia in third trimester (HCC)       Lab:  Recent Labs     20  1355   ABOGROUP O   RUBELLAIGG 310.60   HEPBSAG Negative   HEPCAB Negative       A/P:  30 y.o.  at 37w2d presents for routine obstetric follow-up.     #Prenatal care  - Delivery plan: spont labor  - Continue prenatal vitamins.  - labor precautions and fetal kick counts at 28 weeks.  29yo  dated by LMP  Problems:  --thrombocytopenia (likely gestational)  O+, RI, RPR neg, Hep B neg, HIV neg  Genetic screen: neg  1 hr GTT WNL  Gestational thrombocytopenia mild (132), repeat 131 on , stable.  Anatomy scan normal  --GBS neg   Delivery plan: spon labor - didn't have epidural last delivery so will try again without - aware that if plts become very low may pose issue with spinal  PP planning: pediatrician - [facility] unsure, plans to BF     - Follow-up in 1 weeks.

## 2020-06-11 ENCOUNTER — ROUTINE PRENATAL (OUTPATIENT)
Dept: OBGYN | Facility: CLINIC | Age: 30
End: 2020-06-11
Payer: COMMERCIAL

## 2020-06-11 VITALS — WEIGHT: 193 LBS | DIASTOLIC BLOOD PRESSURE: 83 MMHG | BODY MASS INDEX: 38.09 KG/M2 | SYSTOLIC BLOOD PRESSURE: 123 MMHG

## 2020-06-11 DIAGNOSIS — D69.6 BENIGN GESTATIONAL THROMBOCYTOPENIA IN THIRD TRIMESTER (HCC): ICD-10-CM

## 2020-06-11 DIAGNOSIS — O99.113 BENIGN GESTATIONAL THROMBOCYTOPENIA IN THIRD TRIMESTER (HCC): ICD-10-CM

## 2020-06-11 PROCEDURE — 90040 PR PRENATAL FOLLOW UP: CPT | Performed by: OBSTETRICS & GYNECOLOGY

## 2020-06-11 RX ORDER — VALACYCLOVIR HYDROCHLORIDE 500 MG/1
500 TABLET, FILM COATED ORAL 2 TIMES DAILY
Qty: 60 TAB | Refills: 0 | Status: ON HOLD | OUTPATIENT
Start: 2020-06-11 | End: 2020-06-20

## 2020-06-11 ASSESSMENT — FIBROSIS 4 INDEX: FIB4 SCORE: 1.26

## 2020-06-11 NOTE — PROGRESS NOTES
Pt here today for OB follow up  Pt states wants to be checked  Reports +ERIKA  Good # [phone]  Pharmacy Confirmed.  Chaperone offered and declined.

## 2020-06-11 NOTE — PROGRESS NOTES
S: Pt presents for routine OB follow up.  No contractions, vaginal bleeding, or leaking fluids. Good fetal movement.  Pt with hx of HSV in genital region however not on ppx yet. Denies any tingling or pain    O: /83   Wt 87.5 kg (193 lb)   LMP 2019 (Approximate)   BMI 38.09 kg/m²   Vitals:    20 1607   BP: 123/83   Weight: 87.5 kg (193 lb)     Vitals, fundal height , fetal position, and FHR reviewed on flowsheet    Patient Active Problem List   Diagnosis   • Ventral hernia without obstruction or gangrene   • School physical exam   • Benign gestational thrombocytopenia in third trimester (HCC)       Lab:  Recent Labs     20  1355   ABOGROUP O   RUBELLAIGG 310.60   HEPBSAG Negative   HEPCAB Negative       A/P:  30 y.o.  at 38w3d presents for routine obstetric follow-up.     #Prenatal care  29yo  dated by LMP  Problems:  -- Hx HSV: started valtrex pps  --thrombocytopenia (likely gestational)  O+, RI, RPR neg, Hep B neg, HIV neg  Genetic screen: neg  1 hr GTT WNL  Gestational thrombocytopenia mild (132), repeat 131 on , stable.  Anatomy scan normal  --GBS neg   Delivery plan: spon labor - didn't have epidural last delivery so will try again without - aware that if plts become very low may pose issue with spinal  PP planning: pediatrician - [facility],  BC unsure, plans to BF       - Follow-up in 1 weeks.

## 2020-06-18 ENCOUNTER — ROUTINE PRENATAL (OUTPATIENT)
Dept: OBGYN | Facility: CLINIC | Age: 30
End: 2020-06-18
Payer: COMMERCIAL

## 2020-06-18 ENCOUNTER — TELEPHONE (OUTPATIENT)
Dept: OBGYN | Facility: CLINIC | Age: 30
End: 2020-06-18

## 2020-06-18 ENCOUNTER — HOSPITAL ENCOUNTER (INPATIENT)
Facility: MEDICAL CENTER | Age: 30
LOS: 2 days | End: 2020-06-20
Attending: OBSTETRICS & GYNECOLOGY | Admitting: OBSTETRICS & GYNECOLOGY
Payer: COMMERCIAL

## 2020-06-18 VITALS — SYSTOLIC BLOOD PRESSURE: 123 MMHG | DIASTOLIC BLOOD PRESSURE: 81 MMHG

## 2020-06-18 DIAGNOSIS — Z3A.40 40 WEEKS GESTATION OF PREGNANCY: ICD-10-CM

## 2020-06-18 DIAGNOSIS — O99.113 BENIGN GESTATIONAL THROMBOCYTOPENIA IN THIRD TRIMESTER (HCC): ICD-10-CM

## 2020-06-18 DIAGNOSIS — D69.6 BENIGN GESTATIONAL THROMBOCYTOPENIA IN THIRD TRIMESTER (HCC): ICD-10-CM

## 2020-06-18 LAB
A1 MICROGLOB PLACENTAL VAG QL: POSITIVE
BASOPHILS # BLD AUTO: 0.3 % (ref 0–1.8)
BASOPHILS # BLD: 0.04 K/UL (ref 0–0.12)
EOSINOPHIL # BLD AUTO: 0.06 K/UL (ref 0–0.51)
EOSINOPHIL NFR BLD: 0.4 % (ref 0–6.9)
ERYTHROCYTE [DISTWIDTH] IN BLOOD BY AUTOMATED COUNT: 44.4 FL (ref 35.9–50)
HCT VFR BLD AUTO: 38.2 % (ref 37–47)
HGB BLD-MCNC: 12.5 G/DL (ref 12–16)
HOLDING TUBE BB 8507: NORMAL
IMM GRANULOCYTES # BLD AUTO: 0.15 K/UL (ref 0–0.11)
IMM GRANULOCYTES NFR BLD AUTO: 1.1 % (ref 0–0.9)
LYMPHOCYTES # BLD AUTO: 1.5 K/UL (ref 1–4.8)
LYMPHOCYTES NFR BLD: 10.7 % (ref 22–41)
MCH RBC QN AUTO: 29.2 PG (ref 27–33)
MCHC RBC AUTO-ENTMCNC: 32.7 G/DL (ref 33.6–35)
MCV RBC AUTO: 89.3 FL (ref 81.4–97.8)
MONOCYTES # BLD AUTO: 0.85 K/UL (ref 0–0.85)
MONOCYTES NFR BLD AUTO: 6 % (ref 0–13.4)
NEUTROPHILS # BLD AUTO: 11.47 K/UL (ref 2–7.15)
NEUTROPHILS NFR BLD: 81.5 % (ref 44–72)
NRBC # BLD AUTO: 0 K/UL
NRBC BLD-RTO: 0 /100 WBC
PLATELET # BLD AUTO: 132 K/UL (ref 164–446)
PMV BLD AUTO: 11.8 FL (ref 9–12.9)
RBC # BLD AUTO: 4.28 M/UL (ref 4.2–5.4)
WBC # BLD AUTO: 14.1 K/UL (ref 4.8–10.8)

## 2020-06-18 PROCEDURE — 770002 HCHG ROOM/CARE - OB PRIVATE (112)

## 2020-06-18 PROCEDURE — 90040 PR PRENATAL FOLLOW UP: CPT | Performed by: OBSTETRICS & GYNECOLOGY

## 2020-06-18 PROCEDURE — A9270 NON-COVERED ITEM OR SERVICE: HCPCS | Performed by: STUDENT IN AN ORGANIZED HEALTH CARE EDUCATION/TRAINING PROGRAM

## 2020-06-18 PROCEDURE — 84112 EVAL AMNIOTIC FLUID PROTEIN: CPT

## 2020-06-18 PROCEDURE — 36415 COLL VENOUS BLD VENIPUNCTURE: CPT

## 2020-06-18 PROCEDURE — 700102 HCHG RX REV CODE 250 W/ 637 OVERRIDE(OP): Performed by: STUDENT IN AN ORGANIZED HEALTH CARE EDUCATION/TRAINING PROGRAM

## 2020-06-18 PROCEDURE — 85025 COMPLETE CBC W/AUTO DIFF WBC: CPT

## 2020-06-18 RX ORDER — HYDROXYZINE 50 MG/1
50 TABLET, FILM COATED ORAL EVERY 6 HOURS PRN
Status: DISCONTINUED | OUTPATIENT
Start: 2020-06-18 | End: 2020-06-19 | Stop reason: HOSPADM

## 2020-06-18 RX ORDER — SODIUM CHLORIDE, SODIUM LACTATE, POTASSIUM CHLORIDE, CALCIUM CHLORIDE 600; 310; 30; 20 MG/100ML; MG/100ML; MG/100ML; MG/100ML
INJECTION, SOLUTION INTRAVENOUS CONTINUOUS
Status: ACTIVE | OUTPATIENT
Start: 2020-06-18 | End: 2020-06-18

## 2020-06-18 RX ORDER — ALUMINA, MAGNESIA, AND SIMETHICONE 2400; 2400; 240 MG/30ML; MG/30ML; MG/30ML
30 SUSPENSION ORAL EVERY 6 HOURS PRN
Status: DISCONTINUED | OUTPATIENT
Start: 2020-06-18 | End: 2020-06-19 | Stop reason: HOSPADM

## 2020-06-18 RX ORDER — DEXTROSE, SODIUM CHLORIDE, SODIUM LACTATE, POTASSIUM CHLORIDE, AND CALCIUM CHLORIDE 5; .6; .31; .03; .02 G/100ML; G/100ML; G/100ML; G/100ML; G/100ML
INJECTION, SOLUTION INTRAVENOUS CONTINUOUS
Status: DISCONTINUED | OUTPATIENT
Start: 2020-06-18 | End: 2020-06-20

## 2020-06-18 RX ORDER — ENEMA 19; 7 G/133ML; G/133ML
1 ENEMA RECTAL PRN
Status: DISCONTINUED | OUTPATIENT
Start: 2020-06-18 | End: 2020-06-19 | Stop reason: HOSPADM

## 2020-06-18 RX ORDER — VALACYCLOVIR HYDROCHLORIDE 500 MG/1
500 TABLET, FILM COATED ORAL 2 TIMES DAILY
Status: DISCONTINUED | OUTPATIENT
Start: 2020-06-18 | End: 2020-06-20 | Stop reason: HOSPADM

## 2020-06-18 RX ORDER — CITRIC ACID/SODIUM CITRATE 334-500MG
30 SOLUTION, ORAL ORAL EVERY 6 HOURS PRN
Status: DISCONTINUED | OUTPATIENT
Start: 2020-06-18 | End: 2020-06-19 | Stop reason: HOSPADM

## 2020-06-18 RX ORDER — MISOPROSTOL 200 UG/1
800 TABLET ORAL
Status: DISCONTINUED | OUTPATIENT
Start: 2020-06-18 | End: 2020-06-19 | Stop reason: HOSPADM

## 2020-06-18 RX ADMIN — VALACYCLOVIR HYDROCHLORIDE 500 MG: 500 TABLET, FILM COATED ORAL at 19:22

## 2020-06-18 SDOH — ECONOMIC STABILITY: FOOD INSECURITY: WITHIN THE PAST 12 MONTHS, THE FOOD YOU BOUGHT JUST DIDN'T LAST AND YOU DIDN'T HAVE MONEY TO GET MORE.: NEVER TRUE

## 2020-06-18 SDOH — ECONOMIC STABILITY: TRANSPORTATION INSECURITY
IN THE PAST 12 MONTHS, HAS LACK OF TRANSPORTATION KEPT YOU FROM MEETINGS, WORK, OR FROM GETTING THINGS NEEDED FOR DAILY LIVING?: NO

## 2020-06-18 SDOH — ECONOMIC STABILITY: FOOD INSECURITY: WITHIN THE PAST 12 MONTHS, YOU WORRIED THAT YOUR FOOD WOULD RUN OUT BEFORE YOU GOT MONEY TO BUY MORE.: NEVER TRUE

## 2020-06-18 SDOH — ECONOMIC STABILITY: TRANSPORTATION INSECURITY
IN THE PAST 12 MONTHS, HAS THE LACK OF TRANSPORTATION KEPT YOU FROM MEDICAL APPOINTMENTS OR FROM GETTING MEDICATIONS?: NO

## 2020-06-18 ASSESSMENT — PATIENT HEALTH QUESTIONNAIRE - PHQ9
1. LITTLE INTEREST OR PLEASURE IN DOING THINGS: NOT AT ALL
1. LITTLE INTEREST OR PLEASURE IN DOING THINGS: NOT AT ALL
2. FEELING DOWN, DEPRESSED, IRRITABLE, OR HOPELESS: NOT AT ALL
SUM OF ALL RESPONSES TO PHQ9 QUESTIONS 1 AND 2: 0
SUM OF ALL RESPONSES TO PHQ9 QUESTIONS 1 AND 2: 0
2. FEELING DOWN, DEPRESSED, IRRITABLE, OR HOPELESS: NOT AT ALL

## 2020-06-18 ASSESSMENT — LIFESTYLE VARIABLES
TOTAL SCORE: 0
HAVE YOU EVER FELT YOU SHOULD CUT DOWN ON YOUR DRINKING: NO
TOTAL SCORE: 0
ALCOHOL_USE: NO
EVER FELT BAD OR GUILTY ABOUT YOUR DRINKING: NO
TOTAL SCORE: 0
HAVE PEOPLE ANNOYED YOU BY CRITICIZING YOUR DRINKING: NO
EVER HAD A DRINK FIRST THING IN THE MORNING TO STEADY YOUR NERVES TO GET RID OF A HANGOVER: NO
CONSUMPTION TOTAL: INCOMPLETE
EVER_SMOKED: NEVER

## 2020-06-18 ASSESSMENT — FIBROSIS 4 INDEX: FIB4 SCORE: 1.26

## 2020-06-18 NOTE — TELEPHONE ENCOUNTER
Pt called regarding that she was leaking fluid and had a little blood. She said the leaking stopped and I explained that it might've been the gel because it softens up and feels like fluid is leaking. I also explained that since the bleeding it super light pink it might've been from Dr. Devlin stripping her membranes. I gave her precautions when to go to L&D. If she is leaking again, keep a pad and monitor how much fluid is and give us a call back. No further questions,

## 2020-06-18 NOTE — PROGRESS NOTES
Pt here today for OB follow up  Pt states she wants to be checked  Reports +ERIKA Haq # [phone]   Pharmacy Confirmed.  Chaperone offered and declined.   GBS neg

## 2020-06-18 NOTE — PROGRESS NOTES
"1225 - Patient of [facility] presents with c/o srom. Stubbs Gestation today at 39.3 weeks    Reports a gush of clear fluid around 10 am then again less than an hour later. Reports she was seen at [facility] today for a scheduled PNV and was 1cm at that time; stripped membranes.     Denies contractions/cramping, Denies problems with Pregnancy, denies Bleeding - some light pink spotting since appointment. Denies change to vision/edema/HA, Reports FM. FM/TOCO use discussed and placed. FOB \"POC discussed.     SVE  1+/50/-3 no blood, scant clear fluid noted on the exam glove.    Dry erase board updated with RN contact information; reviewed.   Patient encouraged to call RN with all questions concerns needs prn.    Report to Dr. Saldana regarding patient arrival/complaint/status.     1325 - Update from the lab, reporting the amni sure is positive. Update to Dr. Saldana, patient and Kindra HARMAN RN/Charge.     1420 - Report to Milagro ROWE RN. Patient to L&D with RN and FOB.   "

## 2020-06-18 NOTE — TELEPHONE ENCOUNTER
Pt called again regarding leaking of fluid again. I recommend pt to go to the L&D to check for fluid since she is 39 weeks. No further questions.

## 2020-06-18 NOTE — PROGRESS NOTES
ERICK:  39w3d    Pt reports doing well.  Denies vaginal bleeding, contractions, LOF.  Reports +FM.    /81   LMP 2019 (Approximate)   gen: AAO, NAD  FHTs: 145  FH: 40  SVE: /-3, membranes stripped    A/P:   31yo  dated by LMP  Problems:  -- Hx HSV: started valtrex pps  --thrombocytopenia (likely gestational)  O+, RI, RPR neg, Hep B neg, HIV neg  Genetic screen: neg  1 hr GTT WNL  Gestational thrombocytopenia mild (132), repeat 131 on , stable.  Anatomy scan normal  --GBS neg   Delivery plan: spon labor - didn't have epidural last delivery so will try again without - aware that if plts become very low may pose issue with spinal  PP planning: pediatrician - [facility],  BC vasectomy, plans to BF

## 2020-06-18 NOTE — H&P
History and Physical    Soy Zhu is a 30 y.o. female  -Para:     Gestational Age:  39w3d  Admitted for:   SROM  Admitted to  [facility] Labor and Delivery.  Patient received prenatal care: [facility]    HPI:Pt is a  29 yo female at 39w3d based on LMP. She is here for concerns of leaking fluid that started today after her membranes were stripped in the office at 0930. She reports a gush of fluid after this that soaked her underwear. She reports soaking 2 pads today. She reports a light pink discharge. She denies contractions but feels occasional tightness of her abdomen.   Pregnancy complicated by gestational thrombocytopenia that has been mild and stable. She has been on valacyclovir since Thursday for herpes and her last lesion was years ago.    negative  For CTXS.   negative Feels pain   positive for LOF  negative for vaginal bleeding.   positive for fetal movement    Patient denies fever, chills, nausea, vomiting , headache, visual disturbance, or dysuria  Patient's last menstrual period was 2019 (approximate).  Estimated Date of Delivery: 20  Final ISAIAS: 2020, by Last Menstrual Period    Patient Active Problem List    Diagnosis Date Noted   • Benign gestational thrombocytopenia in third trimester (HCC) 2020   • School physical exam 2019   • Ventral hernia without obstruction or gangrene 2018       Admitting DX: Pregnancy  Pregnancy Complications:  Thrombocytopenia of pregnancy, herpes  OB Risk Factors:   none  Labor State:    Early latent labor.    History:  Past medical Hx:   Patient with a history of anemia and asthma    GYN Hx:   Patient is a . She has a history of genital herpes and reports that her last lesion was years ago.She has been taking valacyclovir for prophylaxis.     Surgica Hx:  Denies past surgeries.    OB History    Para Term  AB Living   2 1 1     1   SAB TAB Ectopic Molar Multiple Live Births              1      # Outcome Date GA Lbr Rey/2nd Weight Sex Delivery Anes PTL Lv   2 Current            1 Term 10/14/12 38w0d  3.033 kg (6 lb 11 oz) F Vag-Spont None  NASREEN      Birth Comments: System Generated. Please review and update pregnancy details.       Medications:  No current facility-administered medications on file prior to encounter.      Current Outpatient Medications on File Prior to Encounter   Medication Sig Dispense Refill   • valACYclovir (VALTREX) 500 MG Tab Take 1 Tab by mouth 2 times a day for 30 days. 60 Tab 0   • Prenatal Vit-Fe Sulfate-FA (PRENATAL MULTIVIT-IRON PO) Take  by mouth.     • Prenatal Multivit-Min-Fe-FA (PRENATAL VITAMINS PO) Take  by mouth every day.         Allergies:  Penicillins    ROS:   Neuro: negative for headache    Cardiovascular: negative for chest pain  Gastro intestinal: negative for diarrhea  Genitourinary: negative for painful urination            Physical Exam:  /82   Pulse (!) 116   Resp 18   Ht 1.524 m (5')   Wt 86.2 kg (190 lb)   LMP 09/16/2019 (Approximate)   SpO2 95%   BMI 37.11 kg/m²   Constitutional: healthy-appearing, Well-developed, well-nourished  HEENT: EOMI  Cardio: regular rate and rhythm, S1, S2 normal, no murmur, click, rub or gallop  Lung: unlabored respirations, no intercostal retractions or accessory muscle use  Abdomen: abdomen is soft without significant tenderness, gravid  Extremity: no edema, redness or tenderness in the calves or thighs    Cervix:  1cm/thick/high  Woodman: Uterine Contractions Q 2-3 minutes.   FHRM: Baseline 125, Accels to 160, no decels, moderate variability      Labs:      Prenatal Results     General (Most Recent Result)     Test Value Date Time    ABO O  01/02/20 1355    Rh POS  01/02/20 1355    Antibody screen NEG  01/02/20 1355    HbA1c       Gonorrhea Negative  12/11/19 1439    Chlamydia  by PCR Negative  12/11/19 1439    Chlamydia by DNA       RPR/Syphilus Non-Reactive  03/11/20 0825    HSV 1/2 by PCR (non-serum)   Abnormal    (See Report)   07/19/15 1620    HSV 1/2 (serum)       HSV 1       HSV 2       HPV (16)       HPV (18)       HPV (other)       HBsAg Negative  01/02/20 1355    HIV-1 HIV-2 Antibodies Non Reactive  01/02/20 1355    Rubella 310.60 IU/mL 01/02/20 1355    Tb Negative  09/15/16 1129          Pap Smear (Most Recent Result)     Test Value Date Time    Pap smear       Pap smear w/HPV       Pap smear w/CTNG       Pap smar w/HPV CTNG       Pap smear (reflex HPV ACUS)  (See Report)   11/30/17 1420    Pap smear (reflex HPV ASCUS w/CTNG)  (See Report)   12/11/19 1439    Pathology gyn specimen  (See Report)   12/11/19 1439          Urinalysis (Most Recent Result)     Test Value Date Time    Urinalysis       Urinalysis (culture if indicated)       POC urinalysis  (See Report)   08/06/19 1212    Urine drug screen       Urine drug screen (w/o conf)       Urine culture ([id number])       Urine culture ([id number])  (See Report)   08/06/19 1212          1st Trimester     Test Value Date Time    Hgb       Hct       Fasting Glucose Tolerance       GTT, 1 hour       GTT, 2 hours       GTT, 3 hours             2nd Trimester     Test Value Date Time    Hgb 11.2 g/dL 03/11/20 0825      12.0 g/dL 01/02/20 1355    Hct 33.8 % 03/11/20 0825      35.9 % 01/02/20 1355    Urinalysis       Urine Culture       AST       ALT       Uric Acid       Fasting Glucose Tolerance       GTT, 1 hour       GTT, 2 hours       GTT, 3 hours       Urine Protein/Creatinine Ratio             3rd Trimester     Test Value Date Time    Hgb 11.2 g/dL 05/27/20 1029    Hct 35.7 % 05/27/20 1029    Platelet count 131 K/uL 05/27/20 1029    GBS (STONE BROTH) Negative  05/27/20 0821    GBS (Direct) Negative  05/27/20 0821    Urinalysis       Urine Culture       Urine Drug Screen       Urine Protein/Creatinine Ratio             Congenital Disease Screening     Test Value Date Time    First Trimester Screen       Quad Screen       Sickle Cell       Thalassemia        [facility]       Cystic Fibrosis Carrier Study                     Assessment:  Gestational Age:  39w3d  Labor State:   SROM, early latent labor  Pregnancy Complications: Thrombocytopenia of pregnancy, herpes on acyclovir    Patient Active Problem List    Diagnosis Date Noted   • Benign gestational thrombocytopenia in third trimester (HCC) 05/12/2020   • School physical exam 01/18/2019   • Ventral hernia without obstruction or gangrene 06/14/2018       Plan:   Admitted for: SROM and augmentation of labor  Admit for augmentation of labor. Patient SROM'ed at 0930 today. Will need cervical ripening.  Will use pitocin as needed.  GBS negative  Prenatal labs: HIV non-reactive, Hep B negative, RPR non-reactive, rubella immune  Valacylovir for herpes pppx    Patient seen with and care discussed with the attending physician, Dr. Poon.    Florin Saldana M.D.

## 2020-06-19 LAB
ERYTHROCYTE [DISTWIDTH] IN BLOOD BY AUTOMATED COUNT: 46.3 FL (ref 35.9–50)
HCT VFR BLD AUTO: 33.7 % (ref 37–47)
HGB BLD-MCNC: 10.9 G/DL (ref 12–16)
MCH RBC QN AUTO: 29.5 PG (ref 27–33)
MCHC RBC AUTO-ENTMCNC: 32.3 G/DL (ref 33.6–35)
MCV RBC AUTO: 91.1 FL (ref 81.4–97.8)
PLATELET # BLD AUTO: 108 K/UL (ref 164–446)
PMV BLD AUTO: 12.3 FL (ref 9–12.9)
RBC # BLD AUTO: 3.7 M/UL (ref 4.2–5.4)
WBC # BLD AUTO: 20.5 K/UL (ref 4.8–10.8)

## 2020-06-19 PROCEDURE — 59400 OBSTETRICAL CARE: CPT | Performed by: ADVANCED PRACTICE MIDWIFE

## 2020-06-19 PROCEDURE — 10907ZC DRAINAGE OF AMNIOTIC FLUID, THERAPEUTIC FROM PRODUCTS OF CONCEPTION, VIA NATURAL OR ARTIFICIAL OPENING: ICD-10-PCS | Performed by: ADVANCED PRACTICE MIDWIFE

## 2020-06-19 PROCEDURE — 36415 COLL VENOUS BLD VENIPUNCTURE: CPT

## 2020-06-19 PROCEDURE — 0KQM0ZZ REPAIR PERINEUM MUSCLE, OPEN APPROACH: ICD-10-PCS | Performed by: ADVANCED PRACTICE MIDWIFE

## 2020-06-19 PROCEDURE — A9270 NON-COVERED ITEM OR SERVICE: HCPCS | Performed by: STUDENT IN AN ORGANIZED HEALTH CARE EDUCATION/TRAINING PROGRAM

## 2020-06-19 PROCEDURE — 700102 HCHG RX REV CODE 250 W/ 637 OVERRIDE(OP): Performed by: ADVANCED PRACTICE MIDWIFE

## 2020-06-19 PROCEDURE — 304965 HCHG RECOVERY SERVICES

## 2020-06-19 PROCEDURE — 700102 HCHG RX REV CODE 250 W/ 637 OVERRIDE(OP): Performed by: STUDENT IN AN ORGANIZED HEALTH CARE EDUCATION/TRAINING PROGRAM

## 2020-06-19 PROCEDURE — 85027 COMPLETE CBC AUTOMATED: CPT

## 2020-06-19 PROCEDURE — 770002 HCHG ROOM/CARE - OB PRIVATE (112)

## 2020-06-19 PROCEDURE — 59409 OBSTETRICAL CARE: CPT

## 2020-06-19 PROCEDURE — A9270 NON-COVERED ITEM OR SERVICE: HCPCS | Performed by: ADVANCED PRACTICE MIDWIFE

## 2020-06-19 PROCEDURE — 700111 HCHG RX REV CODE 636 W/ 250 OVERRIDE (IP)

## 2020-06-19 PROCEDURE — 700101 HCHG RX REV CODE 250

## 2020-06-19 RX ORDER — ACETAMINOPHEN 325 MG/1
325 TABLET ORAL EVERY 4 HOURS PRN
Status: DISCONTINUED | OUTPATIENT
Start: 2020-06-19 | End: 2020-06-20 | Stop reason: HOSPADM

## 2020-06-19 RX ORDER — VITAMIN A ACETATE, BETA CAROTENE, ASCORBIC ACID, CHOLECALCIFEROL, .ALPHA.-TOCOPHEROL ACETATE, DL-, THIAMINE MONONITRATE, RIBOFLAVIN, NIACINAMIDE, PYRIDOXINE HYDROCHLORIDE, FOLIC ACID, CYANOCOBALAMIN, CALCIUM CARBONATE, FERROUS FUMARATE, ZINC OXIDE, CUPRIC OXIDE 3080; 12; 120; 400; 1; 1.84; 3; 20; 22; 920; 25; 200; 27; 10; 2 [IU]/1; UG/1; MG/1; [IU]/1; MG/1; MG/1; MG/1; MG/1; MG/1; [IU]/1; MG/1; MG/1; MG/1; MG/1; MG/1
1 TABLET, FILM COATED ORAL EVERY MORNING
Status: DISCONTINUED | OUTPATIENT
Start: 2020-06-19 | End: 2020-06-20 | Stop reason: HOSPADM

## 2020-06-19 RX ORDER — ACETAMINOPHEN 325 MG/1
650 TABLET ORAL EVERY 4 HOURS PRN
Status: DISCONTINUED | OUTPATIENT
Start: 2020-06-19 | End: 2020-06-20 | Stop reason: HOSPADM

## 2020-06-19 RX ORDER — SODIUM CHLORIDE, SODIUM LACTATE, POTASSIUM CHLORIDE, CALCIUM CHLORIDE 600; 310; 30; 20 MG/100ML; MG/100ML; MG/100ML; MG/100ML
INJECTION, SOLUTION INTRAVENOUS PRN
Status: DISCONTINUED | OUTPATIENT
Start: 2020-06-19 | End: 2020-06-20 | Stop reason: HOSPADM

## 2020-06-19 RX ORDER — METOCLOPRAMIDE HYDROCHLORIDE 5 MG/ML
10 INJECTION INTRAMUSCULAR; INTRAVENOUS EVERY 6 HOURS PRN
Status: DISCONTINUED | OUTPATIENT
Start: 2020-06-19 | End: 2020-06-20 | Stop reason: HOSPADM

## 2020-06-19 RX ORDER — DOCUSATE SODIUM 100 MG/1
100 CAPSULE, LIQUID FILLED ORAL 2 TIMES DAILY PRN
Status: DISCONTINUED | OUTPATIENT
Start: 2020-06-19 | End: 2020-06-20 | Stop reason: HOSPADM

## 2020-06-19 RX ORDER — ONDANSETRON 4 MG/1
4 TABLET, ORALLY DISINTEGRATING ORAL EVERY 6 HOURS PRN
Status: DISCONTINUED | OUTPATIENT
Start: 2020-06-19 | End: 2020-06-20 | Stop reason: HOSPADM

## 2020-06-19 RX ORDER — IBUPROFEN 800 MG/1
800 TABLET ORAL EVERY 8 HOURS PRN
Status: DISCONTINUED | OUTPATIENT
Start: 2020-06-19 | End: 2020-06-20 | Stop reason: HOSPADM

## 2020-06-19 RX ORDER — LIDOCAINE HYDROCHLORIDE 10 MG/ML
INJECTION, SOLUTION INFILTRATION; PERINEURAL
Status: COMPLETED
Start: 2020-06-19 | End: 2020-06-19

## 2020-06-19 RX ORDER — ONDANSETRON 2 MG/ML
4 INJECTION INTRAMUSCULAR; INTRAVENOUS EVERY 6 HOURS PRN
Status: DISCONTINUED | OUTPATIENT
Start: 2020-06-19 | End: 2020-06-20 | Stop reason: HOSPADM

## 2020-06-19 RX ADMIN — LIDOCAINE HYDROCHLORIDE: 10 INJECTION, SOLUTION INFILTRATION; PERINEURAL at 00:45

## 2020-06-19 RX ADMIN — Medication: at 02:00

## 2020-06-19 RX ADMIN — VALACYCLOVIR HYDROCHLORIDE 500 MG: 500 TABLET, FILM COATED ORAL at 06:01

## 2020-06-19 RX ADMIN — VALACYCLOVIR HYDROCHLORIDE 500 MG: 500 TABLET, FILM COATED ORAL at 20:24

## 2020-06-19 RX ADMIN — PRENATAL WITH FERROUS FUM AND FOLIC ACID 1 TABLET: 3080; 920; 120; 400; 22; 1.84; 3; 20; 10; 1; 12; 200; 27; 25; 2 TABLET ORAL at 09:15

## 2020-06-19 ASSESSMENT — ENCOUNTER SYMPTOMS
ABDOMINAL PAIN: 1
VOMITING: 0
FEVER: 0
CHILLS: 0
SHORTNESS OF BREATH: 0
NAUSEA: 0

## 2020-06-19 NOTE — L&D DELIVERY NOTE
Admit Date:   20     Pregnancy Complications: none    GBS: negative  Anesthesia: none  Gestational Age at delivery: 39w4d    Patient  progressed well with after AROM of forebag and multiple position changes to deliver viable female infant in SHAWNA position at 0020 with coached pushing efforts. No nuchal noted. Infant was placed to maternal abdomen where she was dried and stimulated. A spontaneous cry was noted. Apgar 8, 9      Pitocin infused via IV bolus. After delayed cord clamping, cord was doubly clamped and cut by father. Signs of placenta separation noted and gentle cord traction was completed. Intact placenta was delivered spontaneously at 0030 where 3VC was noted. EBL 200ml. Fundus firm with minimal massage. Inspection of vulva and vagina was completed and 2nd degree laceration noted. Local anesthesia was administered and repair was completed with 3-0 vicryl. Hemostasis and approximation achieved. Routine postpartum care was initiated as mother and infant stable. Infant weight is 3825g         Imani FINNEY/ Dr. Mendes Attending

## 2020-06-19 NOTE — PROGRESS NOTES
Late entry:   Report received from SJ Ramirez RN at 1420, accepted care of pt. Pt here for SROM. PIV established, labs drawn and sent, admission profile complete. Pt renée too much for oral cyto, providers in department updated on status. Will check pt in a few hours.   1730- report to SJ Macario RN.

## 2020-06-19 NOTE — PROGRESS NOTES
Patient transferred from labor and delivery via wheelchair with infant in arms and FOB accompanying with belongings. Two RN verification of infant and parent armbands. Report received from COLLIN Pennington&DANNY RN. Patient oriented to unit, call light, emergency light, and infant security. Assessment completed, fundus firm @ U, lochia light rubra. Patient has already voided and is doing so without problems. Pain management discussed with pt. Patient declines intervention for pain at this time. Second bag of pitocin infusing at 125 ml/hr. IV patent, no s/s of infiltration at insertion site. FOB at bedside and supportive. Patient encouraged to call with needs.

## 2020-06-19 NOTE — CARE PLAN
Problem: Safety  Goal: Will remain free from injury  Outcome: PROGRESSING AS EXPECTED  Patient remains free from injury, reviewed safety and call light use  Goal: Will remain free from falls  Outcome: PROGRESSING AS EXPECTED  Patient remains free from falls

## 2020-06-19 NOTE — PROGRESS NOTES
2181 Bedside report received from Candice Guerrero RN Reviewed plan of care, patient requests  medications  for pain management by request. Without current questions or concerns will call as needed for assistance.

## 2020-06-19 NOTE — PROGRESS NOTES
S: Pt is laying in bed upon entry to room. Reports pain with contractions but located in the back mainly.  at bedside.      O:    Vitals:    20 1730 20 1924 20 1930 20 2130   BP:  103/59     Pulse:  (!) 104     Resp:       Temp: 36.2 °C (97.2 °F)  36.3 °C (97.3 °F) 36.7 °C (98 °F)   TempSrc: Temporal  Temporal Temporal   SpO2:       Weight:       Height:               FHTs:  Baseline 145, pos accels, no decels, moderate variability        Amo: Contractions q 2-3 minutes, strong to palpation SVE: 6/90/-1, OP    A/P:    1.  IUP @ 39w3d   2.  Cat I FHTs    3.  Active Labor- continue expectant management; assisted patient with hands and knees position.    4.  History of HSV- on suppression, exam WNL  5. Anticipate     MARLA Velasco, ESTELAM

## 2020-06-19 NOTE — PROGRESS NOTES
2220: Report received from Erica DEVI. Pt /0 feeling pressure and urge to push. RN continuously at bedside.  2245: Pt comfortable laboring in hands and knees with support of FOB. RN monitoring in nurses station, Pt told to call out when pressure and pushing urge is constant.  2345: RN at bedside, pt feeling a stronger urge to push.   2350: SVE:7-/0. Andrew called to update  0000: Andrew at bedside. Pt uncontrollably pushing, CNM cont at bedside  0020:  of viable male infant. 8/9 apgars, infant skin to skin with mom  0155: Pt up to bathroom, voided, education given on bleeding and kim-bottle, pt transferred to S330. Report given to SHANDRA Harvey. POC discussed.

## 2020-06-19 NOTE — CARE PLAN
Problem: Altered physiologic condition related to immediate post-delivery state and potential for bleeding/hemorrhage  Goal: Patient physiologically stable as evidenced by normal lochia, palpable uterine involution and vital signs within normal limits  Outcome: PROGRESSING AS EXPECTED  Note: VSS and within normal parameters. Fundus palpable and firm, lochia light rubra. Will continue to monitor.       Problem: Alteration in comfort related to episiotomy, vaginal repair and/or after birth pains  Goal: Patient verbalizes acceptable pain level  Outcome: PROGRESSING AS EXPECTED  Note: Pain management discussed with pt. Will notify this RN if PRN pain medication is needed. Will continue to monitor.

## 2020-06-19 NOTE — PROGRESS NOTES
Labor Progress Note:      S:  Pt reports doing well, no concerns.    Patient Vitals for the past 4 hrs:   BP Temp Temp src Pulse   20 1930 -- 36.3 °C (97.3 °F) Temporal --   20 1924 103/59 -- -- (!) 104   20 1730 -- 36.2 °C (97.2 °F) Temporal --     SVE: 5/80/-1 per RN    EFW: 3100g    FHT: 135/mod variability/+ accels/no decels  toco: q2-4min ctx      A/P: 30 y.o.  @ 39w3d by lmp c/w 10wk US admitted for labor/SROM  - labor: pt progressing on own mechanism, still latent.  Cont expectant management  - FHT: cat I  - GBS: neg  -  Rh +/-, RI  - hx of HSV: on valtrex, no sxs, per RN had exam on admission without lesions      Analia Mendes MD  [facility], Women's Health

## 2020-06-19 NOTE — NON-PROVIDER
Daily Progress Note:     Date of Service: 2020  Attending: Jm Poon M.D.       ID:   Pt is a 29 y/o F  PPD#1 s/p  at 39w4d.    Subjective:  Pt reports no complaints since surgery. Her pain is 1/10 and she does not want pain medication. She is experiencing mild bleeding/spotting since the delivery. She denies fever/chills, nausea/vomiting, dysuria. She plans to breastfeed. Her birth control plan is condoms until her partner receives a vasectomy. She does not want a depo shot.    Review of Systems:    Review of Systems   Constitutional: Negative for chills and fever.   Respiratory: Negative for shortness of breath.    Gastrointestinal: Positive for abdominal pain (1/10 abdominal pain). Negative for nausea and vomiting.   Genitourinary: Negative for dysuria.   All other systems reviewed and are negative.      Objective Data:     Vitals:   Temp:  [36.2 °C (97.2 °F)-37.6 °C (99.7 °F)] 37.6 °C (99.7 °F)  Pulse:  [103-121] 111  Resp:  [17-18] 17  BP: (102-126)/(55-82) 102/66  SpO2:  [93 %-95 %] 94 %    Physical Exam  Constitutional:       General: She is not in acute distress.     Appearance: She is not ill-appearing.   HENT:      Head: Normocephalic and atraumatic.      Mouth/Throat:      Mouth: Mucous membranes are moist.      Pharynx: No oropharyngeal exudate or posterior oropharyngeal erythema.   Cardiovascular:      Pulses: Normal pulses.   Pulmonary:      Effort: Pulmonary effort is normal. No respiratory distress.   Abdominal:      General: There is no distension.      Palpations: Abdomen is soft. There is no mass.      Tenderness: There is no abdominal tenderness.      Comments: Uterine fundus is firm and non-tender.   Lymphadenopathy:      Cervical: No cervical adenopathy.   Neurological:      Mental Status: She is alert and oriented to person, place, and time.           Labs:   Results for DREA PARKER (MRN [id number]) as of 2020 14:02   Ref. Range 2020 15:20 2020  09:20   WBC Latest Ref Range: 4.8 - 10.8 K/uL 14.1 (H) 20.5 (H)   RBC Latest Ref Range: 4.20 - 5.40 M/uL 4.28 3.70 (L)   Hemoglobin Latest Ref Range: 12.0 - 16.0 g/dL 12.5 10.9 (L)   Hematocrit Latest Ref Range: 37.0 - 47.0 % 38.2 33.7 (L)   MCV Latest Ref Range: 81.4 - 97.8 fL 89.3 91.1   MCH Latest Ref Range: 27.0 - 33.0 pg 29.2 29.5   MCHC Latest Ref Range: 33.6 - 35.0 g/dL 32.7 (L) 32.3 (L)   RDW Latest Ref Range: 35.9 - 50.0 fL 44.4 46.3   Platelet Count Latest Ref Range: 164 - 446 K/uL 132 (L) 108 (L)   MPV Latest Ref Range: 9.0 - 12.9 fL 11.8 12.3     GBS: negative  Rubella: Immune  HBV: negative  Syphilis: negative  HIV: negative  Blood Type: O+    Assessment:  Pt is a 31 y/o F  PPD#1 s/p  at 39w4d. Pregnancy was complicated by mild thrombocytopenia. Delivery was uncomplicated and patient is recovering comfortably.    Plan:  - Routine PP care  - Tylenol PRN  - Ice PRN  - Dispo: Discharge this afternoon  - F/u in 5 weeks for routine postpartum visit

## 2020-06-19 NOTE — PROGRESS NOTES
EDC - 20 EGA - 39.3    1730 - Report received from ADINA Grey RN Pt standing at bedside, coping well with pain at this time. FOB at bedside. POC discussed with pt and family members, all questions answered.    Dr. Carrion at bedside. SVE 3-/-1 Forebag noted and AROM at this time.  1950 Pt states she is feeling pressure, SVE /-1.   RENÉ Jalloh CNM at bedside SVE /-1 OP pt repositioned into hands and knees   SVE  Report given to ALBER Torres RN. Pt feels like she needs to push at this time.

## 2020-06-20 VITALS
BODY MASS INDEX: 37.3 KG/M2 | HEIGHT: 60 IN | OXYGEN SATURATION: 95 % | DIASTOLIC BLOOD PRESSURE: 79 MMHG | WEIGHT: 190 LBS | TEMPERATURE: 98.6 F | SYSTOLIC BLOOD PRESSURE: 122 MMHG | HEART RATE: 88 BPM | RESPIRATION RATE: 18 BRPM

## 2020-06-20 LAB — EKG IMPRESSION: NORMAL

## 2020-06-20 PROCEDURE — A9270 NON-COVERED ITEM OR SERVICE: HCPCS | Performed by: ADVANCED PRACTICE MIDWIFE

## 2020-06-20 PROCEDURE — 93005 ELECTROCARDIOGRAM TRACING: CPT | Performed by: STUDENT IN AN ORGANIZED HEALTH CARE EDUCATION/TRAINING PROGRAM

## 2020-06-20 PROCEDURE — A9270 NON-COVERED ITEM OR SERVICE: HCPCS | Performed by: STUDENT IN AN ORGANIZED HEALTH CARE EDUCATION/TRAINING PROGRAM

## 2020-06-20 PROCEDURE — 700102 HCHG RX REV CODE 250 W/ 637 OVERRIDE(OP): Performed by: STUDENT IN AN ORGANIZED HEALTH CARE EDUCATION/TRAINING PROGRAM

## 2020-06-20 PROCEDURE — 93010 ELECTROCARDIOGRAM REPORT: CPT | Performed by: INTERNAL MEDICINE

## 2020-06-20 PROCEDURE — 700102 HCHG RX REV CODE 250 W/ 637 OVERRIDE(OP): Performed by: ADVANCED PRACTICE MIDWIFE

## 2020-06-20 RX ORDER — PSEUDOEPHEDRINE HCL 30 MG
100 TABLET ORAL 2 TIMES DAILY PRN
Qty: 60 CAP | Refills: 5 | Status: SHIPPED | OUTPATIENT
Start: 2020-06-20 | End: 2020-07-30

## 2020-06-20 RX ORDER — IBUPROFEN 800 MG/1
800 TABLET ORAL EVERY 8 HOURS PRN
Qty: 30 TAB | Refills: 5 | Status: SHIPPED | OUTPATIENT
Start: 2020-06-20 | End: 2020-07-30

## 2020-06-20 RX ADMIN — PRENATAL WITH FERROUS FUM AND FOLIC ACID 1 TABLET: 3080; 920; 120; 400; 22; 1.84; 3; 20; 10; 1; 12; 200; 27; 25; 2 TABLET ORAL at 07:29

## 2020-06-20 RX ADMIN — VALACYCLOVIR HYDROCHLORIDE 500 MG: 500 TABLET, FILM COATED ORAL at 07:29

## 2020-06-20 ASSESSMENT — EDINBURGH POSTNATAL DEPRESSION SCALE (EPDS)
THE THOUGHT OF HARMING MYSELF HAS OCCURRED TO ME: NEVER
I HAVE BEEN ABLE TO LAUGH AND SEE THE FUNNY SIDE OF THINGS: AS MUCH AS I ALWAYS COULD
THINGS HAVE BEEN GETTING ON TOP OF ME: NO, I HAVE BEEN COPING AS WELL AS EVER
I HAVE BLAMED MYSELF UNNECESSARILY WHEN THINGS WENT WRONG: NO, NEVER
I HAVE FELT SCARED OR PANICKY FOR NO GOOD REASON: NO, NOT AT ALL
I HAVE FELT SAD OR MISERABLE: NO, NOT AT ALL
I HAVE BEEN SO UNHAPPY THAT I HAVE BEEN CRYING: NO, NEVER
I HAVE BEEN SO UNHAPPY THAT I HAVE HAD DIFFICULTY SLEEPING: NOT AT ALL
I HAVE BEEN ANXIOUS OR WORRIED FOR NO GOOD REASON: NO, NOT AT ALL
I HAVE LOOKED FORWARD WITH ENJOYMENT TO THINGS: AS MUCH AS I EVER DID

## 2020-06-20 NOTE — PROGRESS NOTES
Pt discharged at approximately 1602 via wheelchair with hospital escort. Infant placed in carseat by parent and checked by RN. Pt discharge instructions given to patient and . Infants bands checked with parents and cuddles removed. No further questions at this time.

## 2020-06-20 NOTE — PROGRESS NOTES
Update:    Patient was tachycardic this morning and an EKG was done due to irregular heart beat on the monitor.  EKG showed sinus arrhythmia with no signs of heart block.  Patient will discharge home and will follow up with her primary care doctor in 1 month for repeat EKG.  Return precautions discussed with patient. She will return to the ER if she develops dizziness, light headedness, chest pain, or palpitations.

## 2020-06-20 NOTE — PROGRESS NOTES
"T/C to Dr. Saldana report of current Apical pulse of 88, on Pulse oximeter heart rate ranges from . Patient reports history of irregular heart rate \"as infant they thought I had a hole in my heart but I didn't\", states she can feel the irregular beat during exercise at times. Telephone order for EKG related to Tachycardia and irregular heart rate. Read back for confirmation.  "

## 2020-06-20 NOTE — PROGRESS NOTES
Received report from RN. Patient in room holding baby, no signs of distress. Hourly rounding initiated, bed in low position, safety precautions in place. Father at bedside, also participating in care.

## 2020-06-20 NOTE — NON-PROVIDER
Note Author: Becky Mcknight, Student    Name Soy Zhu     1990   Age/Sex 30 y.o. female   MRN [id number]       Reason for interval visit  (Principal Problem)   Postpartum    Interval Problem Daily Status Update  (problem status, last 24 hours, new history, new data )   Ms. Soy Zhu is a 31 yo  postpartum day 1 s/p vaginal delivery following AROM at 39w4d.    No acute complaints  Exclusively breastfeeding  Sleeping well  Urinating and passing flatus  Pain is 0/10  Eating and tolerating well  Denies F/C N/V  Mood is good  Considering condoms and vasectomy for birth control  GBS/Rub negative   mL  2nd deg lac        Physical Exam       Vitals:    20 0559 20 1000 20 1800 20 0557   BP: 102/66 117/74 123/80 122/79   Pulse: (!) 111 (!) 124 (!) 115 (!) 106   Resp: 17 18 18 18   Temp: 37.6 °C (99.7 °F) 36.9 °C (98.5 °F) 37 °C (98.6 °F) 37 °C (98.6 °F)   TempSrc: Temporal Temporal Temporal Temporal   SpO2: 94% 95% 94% 95%   Weight:       Height:         Body mass index is 37.11 kg/m².    Oxygen Therapy:  Pulse Oximetry: 95 %, O2 Delivery Device: None - Room Air    Physical Exam  Cardiovascular: RRR, no MRG  Pulm: CTAB, effort normal, no WRR  Abdomen: soft, nontender, fundus is firm and below the umbilicus, no rigidity or guarding  Extremities: no edema or tenderness      Assessment/Plan     Ms. Soy Zhu is a 31 yo  postpartum day 1 s/p vaginal delivery following AROM at 39w4d.    Pain control w/ tylenol, ibuprofen, and ice  Lactation care  H/H 10.9/33.7 - Iron and colace not indicated  Continue prenatals  F/u w/ OB/GYN

## 2020-06-20 NOTE — DISCHARGE SUMMARY
Discharge Summary:      Soy Zhu    Admit Date:   2020  Discharge Date:  2020     Admitting diagnosis:  Pregnancy  39 weeks gestation of pregnancy  Discharge Diagnosis: Status post vaginal, spontaneous.  Pregnancy Complications: gestational thrombocytopenia   Tubal Ligation:  no        History:  Past Medical History:   Diagnosis Date   • Anemia    • ASTHMA     exercise-induced asthma   • Nose trouble     nose bleeds     OB History    Para Term  AB Living   2 2 2     2   SAB TAB Ectopic Molar Multiple Live Births           0 2      # Outcome Date GA Lbr Rey/2nd Weight Sex Delivery Anes PTL Lv   2 Term 20 39w4d / 00:05 3.825 kg (8 lb 6.9 oz) F Vag-Spont None N NASREEN   1 Term 10/14/12 38w0d  3.033 kg (6 lb 11 oz) F Vag-Spont None  NASREEN      Birth Comments: System Generated. Please review and update pregnancy details.        Penicillins  Patient Active Problem List    Diagnosis Date Noted   • Benign gestational thrombocytopenia in third trimester (HCC) 2020   • Ventral hernia without obstruction or gangrene 2018        Hospital Course:   30 y.o. , now para 2, was admitted with the above mentioned diagnosis, underwent Induction of Labor, vaginal, spontaneous. Patient postpartum course was unremarkable, with progressive advancement in diet , ambulation and toleration of oral analgesia. Patient without complaints today and desires discharge.      Vitals:    20 0559 20 1000 20 1800 20 0557   BP: 102/66 117/74 123/80 122/79   Pulse: (!) 111 (!) 124 (!) 115 (!) 106   Resp: 17 18 18 18   Temp: 37.6 °C (99.7 °F) 36.9 °C (98.5 °F) 37 °C (98.6 °F) 37 °C (98.6 °F)   TempSrc: Temporal Temporal Temporal Temporal   SpO2: 94% 95% 94% 95%   Weight:       Height:           Current Facility-Administered Medications   Medication Dose   • ondansetron (ZOFRAN ODT) dispertab 4 mg  4 mg    Or   • ondansetron (ZOFRAN) syringe/vial injection 4 mg  4 mg   •  metoclopramide (REGLAN) injection 10 mg  10 mg   • ibuprofen (MOTRIN) tablet 800 mg  800 mg   • acetaminophen (TYLENOL) tablet 325 mg  325 mg   • acetaminophen (TYLENOL) tablet 650 mg  650 mg   • LR infusion     • docusate sodium (COLACE) capsule 100 mg  100 mg   • prenatal plus vitamin (STUARTNATAL 1+1) 27-1 MG tablet 1 Tab  1 Tab   • D5LR infusion     • oxytocin (PITOCIN) infusion (for induction)  0.5-20 liban-units/min   • valACYclovir (VALTREX) caplet 500 mg  500 mg       Exam:  Breast Exam: negative  Abdomen: Abdomen soft, non-tender. BS normal. No masses,  No organomegaly  Fundus Non Tender: yes  Incision: none  Perineum: perineum second degree  Extremity: extremities, peripheral pulses and reflexes normal, no edema, redness or tenderness in the calves or thighs     Labs:  Recent Labs     06/18/20  1520 06/19/20  0920   WBC 14.1* 20.5*   RBC 4.28 3.70*   HEMOGLOBIN 12.5 10.9*   HEMATOCRIT 38.2 33.7*   MCV 89.3 91.1   MCH 29.2 29.5   MCHC 32.7* 32.3*   RDW 44.4 46.3   PLATELETCT 132* 108*   MPV 11.8 12.3        Activity:   Discharge to home  Pelvic Rest x 6 weeks    Assessment:  normal postpartum course  Discharge Assessment: No areas of skin breakdown/redness; surgical incision intact/healing     [facility] up: [facility] or [facility] in 5 weeks for vaginal ; 1 week for incision check.   Declines BCM at this time    D/c may be held if pt remains tachycardic, tachycardic since delivery.        Discharge Meds:   Current Outpatient Medications   Medication Sig Dispense Refill   • docusate sodium 100 MG Cap Take 100 mg by mouth 2 times a day as needed for Constipation. 60 Cap 5   • ibuprofen (MOTRIN) 800 MG Tab Take 1 Tab by mouth every 8 hours as needed (For cramping after delivery; do not give if patient is receiving ketorolac (Toradol)). 30 Tab 5       ALEIDA Ellington      OB Attending:  I have discussed Ms. Soy Esquivelcharito Zhu w/ REG Jacobsen. Pt noted to have tachycardia yesterday, mild this AM;  improving this AM but will monitor today and consider d/c if improves today.      Analia Mendes MD  [facility], Women's Health

## 2020-06-20 NOTE — LACTATION NOTE
This note was copied from a baby's chart.  Baby 39.4 weeks, , weight loss 5.23% at 24 hours, couplet to be discharged today. Mother reports she  previous baby for 1+ year, without issues. Mother watched hand expression video, encouraged mother to hand express & spoon feed back in addition to breastfeeding, latch not seen- baby asleep in FOB arms.     Teaching on hunger cues, breastfeeding when baby shows cues or by 4 hours from last feed, importance of skin to skin, positioning baby at breast nipple to nose & cluster feeding.    Contact information for OP breastfeeding support given & Zoom.    Breastfeeding plan:  Breastfeed on cue a minimum of 8x/24 hours or by 4 hours from last feed, hand express & spoon feed back until milk supply comes in.

## 2020-06-20 NOTE — DISCHARGE INSTRUCTIONS
POSTPARTUM DISCHARGE INSTRUCTIONS FOR MOM    YOB: 1990   Age: 30 y.o.               Admit Date: 2020     Discharge Date: 2020  Attending Doctor:  Jm Poon M.D.                  Allergies:  Penicillins    Discharged to home by car. Discharged via wheelchair, hospital escort: Yes.  Special equipment needed: Not Applicable  Belongings with: Personal  Be sure to schedule a follow-up appointment with your primary care doctor or any specialists as instructed.     Discharge Plan:   Diet Plan: Discussed  Activity Level: Discussed  Confirmed Follow up Appointment: Patient to Call and Schedule Appointment  Confirmed Symptoms Management: Discussed  Medication Reconciliation Updated: Yes    REASONS TO CALL YOUR OBSTETRICIAN:  1.   Persistent fever or shaking chills (Temperature higher than 100.4)  2.   Heavy bleeding (soaking more than 1 pad per hour); Passing clots  3.   Foul odor from vagina  4.   Mastitis (Breast infection; breast pain, chills, fever, redness)  5.   Urinary pain, burning or frequency  6.   Episiotomy infection  7.   Abdominal incision infection  8.   Severe depression longer than 24 hours    HAND WASHING  · Prior to handling the baby.  · Before breastfeeding or bottle feeding baby.  · After using the bathroom or changing the baby's diaper.    WOUND CARE  Ask your physician for additional care instructions.  In general:    ·  Incision:      · Keep clean and dry.    · Do NOT lift anything heavier than your baby for up to 6 weeks.    · There should not be any opening or pus.      VAGINAL CARE  · Nothing inside vagina for 6 weeks: no sexual intercourse, tampons or douching.  · Bleeding may continue for 2-4 weeks.  Amount may vary.    · Call your physician for heavy bleeding which means soaking more than 1 pad per hour    BIRTH CONTROL  · It is possible to become pregnant at any time after delivery and while breastfeeding.  · Plan to discuss a method of birth control with  "your physician at your follow up visit. visit.    DIET AND ELIMINATION  · Eating more fiber (bran cereal, fruits, and vegetables) and drinking plenty of fluids will help to avoid constipation.  · Urinary frequency after childbirth is normal.    POSTPARTUM BLUES  During the first few days after birth, you may experience a sense of the \"blues\" which may include impatience, irritability or even crying.  These feeling come and go quickly.  However, as many as 1 in 10 women experience emotional symptoms known as postpartum depression.    Postpartum depression:  May start as early as the second or third day after delivery or take several weeks or months to develop.  Symptoms of \"blues\" are present, but are more intense:  Crying spells; loss of appetite; feelings of hopelessness or loss of control; fear of touching the baby; over concern or no concern at all about the baby; little or no concern about your own appearance/caring for yourself; and/or inability to sleep or excessive sleeping.  Contact your physician if you are experiencing any of these symptoms.    Crisis Hotline:  · [place] Crisis Hotline:  4-758-MGSVCAO  Or 1-894.173.8736  · [place] Crisis Hotline:  1-619.374.9878  Or 268-676-4823    PREVENTING SHAKEN BABY:  If you are angry or stressed, PUT THE BABY IN THE CRIB, step away, take some deep breaths, and wait until you are calm to care for the baby.  DO NOT SHAKE THE BABY.  You are not alone, call a supporter for help.    · Crisis Call Center 24/7 crisis line 032-597-4164 or 1-423.347.1924  · You can also text them, text \"ANSWER\" to 058561    QUIT SMOKING/TOBACCO USE:  I understand the use of any tobacco products increases my chance of suffering from future heart disease and could cause other illnesses which may shorten my life. Quitting the use of tobacco products is the single most important thing I can do to improve my health. For further information on smoking / tobacco cessation call a Toll Free Quit Line " at 1-325.626.2630 (*National [employer] [place]) or 1-291.529.4888 (American Lung Association) or you can access the web based program at www.lungusa.org.    · [place] Tobacco Users Help Line:  (589) 495-4532       Toll Free: 1-364.539.3406  · Quit Tobacco Program [facility] (316)554-5365    DEPRESSION / SUICIDE RISK:  As you are discharged from this [facility], it is important to learn how to keep safe from harming yourself.    Recognize the warning signs:  · Abrupt changes in personality, positive or negative- including increase in energy   · Giving away possessions  · Change in eating patterns- significant weight changes-  positive or negative  · Change in sleeping patterns- unable to sleep or sleeping all the time   · Unwillingness or inability to communicate  · Depression  · Unusual sadness, discouragement and loneliness  · Talk of wanting to die  · Neglect of personal appearance   · Rebelliousness- reckless behavior  · Withdrawal from people/activities they love  · Confusion- inability to concentrate     If you or a loved one observes any of these behaviors or has concerns about self-harm, here's what you can do:  · Talk about it- your feelings and reasons for harming yourself  · Remove any means that you might use to hurt yourself (examples: pills, rope, extension cords, firearm)  · Get professional help from the community (Mental Health, Substance Abuse, psychological counseling)  · Do not be alone:Call your Safe Contact- someone whom you trust who will be there for you.  · Call your local CRISIS HOTLINE 094-3728 or 073-233-7135  · Call your local Children's Mobile Crisis Response Team [place] (208) 619-1772 or www.[employer]  · Call the toll free National Suicide Prevention Hotlines   · National Suicide Prevention Lifeline 804-447-VLHX (1271)  · National Hope Line Network 800-SUICIDE (337-5925)      Thank you for choosing [facility].  We hope we provided you  with very good care.  You may be receiving a survey in the mail.  Please fill it out.  Your opinion is valuable to us.    ADDITIONAL EDUCATIONAL MATERIALS GIVEN TO PATIENT:   Follow up with  primary care doctor in 1 month for repeat EKG.  Return to the ER  for dizziness, light headedness, chest pain, or palpitations.            My signature on this form indicates that:  1.  I have reviewed and understand the above information  2.  My questions regarding this information have been answered to my satisfaction.  3.  I have formulated a plan with my discharge nurse to obtain my prescribed medication for home.

## 2020-06-20 NOTE — PROGRESS NOTES
0627 Bedside report received from Analia Peña RN Reviewed plan of care, patient requests  medications  for pain management by request. Without current questions or concerns will call as needed for assistance.    7946 Secondary review of patient's chart Dr Mendes plans to continue to monitor tachycardia during this admission to determine plan d/c today versus tomorrow.

## 2020-06-20 NOTE — CARE PLAN
Problem: Communication  Goal: The ability to communicate needs accurately and effectively will improve  Outcome: PROGRESSING AS EXPECTED   Patient and SO educated about plan of care, no concerns at this time.   Problem: Safety  Goal: Will remain free from injury  Outcome: PROGRESSING AS EXPECTED   Safety precautions in place. Mother feels comfortable with breast feeding and general infant care. Father at bedside, also participating in care. Mother denies need for pain medication or heat packs despite complaints of mild cramping at times. Will monitor.

## 2020-06-20 NOTE — PROGRESS NOTES
Reviewed Dr Saldana EKG review note. T/C to DR Saldana confirming discharge home today and need for discharge order. Patient may go home today and she will place discharge order

## 2020-07-10 ENCOUNTER — TELEPHONE (OUTPATIENT)
Dept: OBGYN | Facility: CLINIC | Age: 30
End: 2020-07-10

## 2020-07-10 NOTE — TELEPHONE ENCOUNTER
07/10/20 2:17 PM    Pt LM on VM wanting a cardiac referral. I spoke to pt who has a PP appt 7/30, I let pt know to discuss this with the provider at time of her appt as I do not see anything in her discharge to support a cardiac referral, I did see to f/u with PCP, pt states no longer has a PCP.

## 2020-07-30 ENCOUNTER — GYNECOLOGY VISIT (OUTPATIENT)
Dept: OBGYN | Facility: CLINIC | Age: 30
End: 2020-07-30
Payer: COMMERCIAL

## 2020-07-30 VITALS
DIASTOLIC BLOOD PRESSURE: 71 MMHG | HEART RATE: 74 BPM | SYSTOLIC BLOOD PRESSURE: 114 MMHG | WEIGHT: 161 LBS | BODY MASS INDEX: 31.44 KG/M2

## 2020-07-30 DIAGNOSIS — I49.9 IRREGULAR HEART RHYTHM: ICD-10-CM

## 2020-07-30 PROCEDURE — 90050 PR POSTPARTUM VISIT: CPT | Performed by: ADVANCED PRACTICE MIDWIFE

## 2020-07-30 ASSESSMENT — FIBROSIS 4 INDEX: FIB4 SCORE: 1.52

## 2020-07-30 NOTE — NON-PROVIDER
Pt here today for postpartum exam.  Delivery Date 6/19/20  Currently: breast feeding  BCM: declines bc at this time, [facility].   Good ph:[phone]

## 2020-07-30 NOTE — NON-PROVIDER
Subjective   Subjective:    Soy Zhu is a 30 y.o. female who presents for her postpartum exam 6 weeks following . Her prenatal course was without complications.  Her delivery was complicated by a 2nd degree laceration that was repaired. Her method of feeding infant is breast feeding. She desires a vasectomy for her  for her birth control method. Will be using condoms until he has his surgery. Reports history of nausea and emotional instability with low-dose birth control. Reports no sex prior to this appointment. Patient denies crying spells, irritability, or mood swings. Pt reports no PCP at this time due to change in insurance.     Pap WNL on 2019    Eating a regular diet without difficulty.   Bowel movement are Normal.   Breasts: no erythema or discharge. No masses or tenderness per pt.     Breast problems none   Dysuria none  Vaginal bleeding none  Vaginal itching none      Problem List     Patient Active Problem List    Diagnosis Date Noted   • Benign gestational thrombocytopenia in third trimester (HCC) 2020   • Ventral hernia without obstruction or gangrene 2018       Objective    EDPS 0      Lab:  Recent Results (from the past 1008 hour(s))   AMNISURE ROM ASSAY    Collection Time: 20 12:40 PM   Result Value Ref Range    AmniSure ROM Positive (AA) Negative   Hold Blood Bank Specimen (Not Tested)    Collection Time: 20  3:20 PM   Result Value Ref Range    Holding Tube - Bb DONE    CBC WITH DIFFERENTIAL    Collection Time: 20  3:20 PM   Result Value Ref Range    WBC 14.1 (H) 4.8 - 10.8 K/uL    RBC 4.28 4.20 - 5.40 M/uL    Hemoglobin 12.5 12.0 - 16.0 g/dL    Hematocrit 38.2 37.0 - 47.0 %    MCV 89.3 81.4 - 97.8 fL    MCH 29.2 27.0 - 33.0 pg    MCHC 32.7 (L) 33.6 - 35.0 g/dL    RDW 44.4 35.9 - 50.0 fL    Platelet Count 132 (L) 164 - 446 K/uL    MPV 11.8 9.0 - 12.9 fL    Neutrophils-Polys 81.50 (H) 44.00 - 72.00 %    Lymphocytes 10.70 (L) 22.00 - 41.00 %     Monocytes 6.00 0.00 - 13.40 %    Eosinophils 0.40 0.00 - 6.90 %    Basophils 0.30 0.00 - 1.80 %    Immature Granulocytes 1.10 (H) 0.00 - 0.90 %    Nucleated RBC 0.00 /100 WBC    Neutrophils (Absolute) 11.47 (H) 2.00 - 7.15 K/uL    Lymphs (Absolute) 1.50 1.00 - 4.80 K/uL    Monos (Absolute) 0.85 0.00 - 0.85 K/uL    Eos (Absolute) 0.06 0.00 - 0.51 K/uL    Baso (Absolute) 0.04 0.00 - 0.12 K/uL    Immature Granulocytes (abs) 0.15 (H) 0.00 - 0.11 K/uL    NRBC (Absolute) 0.00 K/uL   CBC without differential    Collection Time: 20  9:20 AM   Result Value Ref Range    WBC 20.5 (H) 4.8 - 10.8 K/uL    RBC 3.70 (L) 4.20 - 5.40 M/uL    Hemoglobin 10.9 (L) 12.0 - 16.0 g/dL    Hematocrit 33.7 (L) 37.0 - 47.0 %    MCV 91.1 81.4 - 97.8 fL    MCH 29.5 27.0 - 33.0 pg    MCHC 32.3 (L) 33.6 - 35.0 g/dL    RDW 46.3 35.9 - 50.0 fL    Platelet Count 108 (L) 164 - 446 K/uL    MPV 12.3 9.0 - 12.9 fL   EKG    Collection Time: 20  1:35 PM   Result Value Ref Range    Report       [facility]    Test Date:  2020  Pt Name:    DREA PARKER                Department: 31  MRN:        [id number]                      Room:       S330  Gender:     Female                       Technician: TX  :        1990                   Requested By:MARY JANE THAO  Order #:    [id number]                    Reading MD: Rodriguez Dwyer MD    Measurements  Intervals                                Axis  Rate:       81                           P:          55  CT:         152                          QRS:        96  QRSD:       84                           T:          12  QT:         376  QTc:        437    Interpretive Statements  SINUS RHYTHM  ATRIAL PREMATURE COMPLEX  BORDERLINE RIGHT AXIS DEVIATION  No previous ECG available for comparison  Electronically Signed On 2020 13:55:49 PDT by Rodriguez Dwyer MD       Vitals:    20   BP: 114/71   Weight: 73 kg (161 lb)     Vitals:    2041   BP: 114/71   Pulse: 74      Objective    Well nourished female in no acute distress  A& O x 3  Heart Normal rate, irregular rhythm with inspiration   LCTAB  No edema noted and pulses WNL bilaterally in lower extremities; no claudication  BS x 4; no guarding or tenderness      Genitourinary: Pelvic exam was performed with patient supine. External genitalia with no abnormal pigmentation, labial fusion, rash, tenderness, lesion or injury to the labia bilaterally. Vagina is moist with no lesions, foul discharge, erythema, tenderness or bleeding. Mild discomfort with manipulation of introitus. Epis/laceration well healed.     Chaperone was offered and provided by Amara Jalloh CNM    Assessment   Assessment:    1. Postpartum Exam  2. General Counseling and Advice on Contraception  3. Screening for Postpartum Depression  4. Irregular heart rhythm on insiration    Plan   Plan:    1. Breastfeeding support   2. Continue PNV   3. Contraceptive counseling- plans condom use and vasectomy.  4. Discussed diet, exercise and resumption of sexual activity.  Discussed signs and symptoms of stress incontinence and reviewed pelvic floor exercises.    5. Referral to cardiology for further assessment of irregular heart rhythm.  6. Encouraged pt to establish care with a PCP.  5. Follow up in 1 year or prn    Analia HATHAWAY

## 2020-08-04 ENCOUNTER — TELEPHONE (OUTPATIENT)
Dept: CARDIOLOGY | Facility: MEDICAL CENTER | Age: 30
End: 2020-08-04

## 2020-08-04 NOTE — TELEPHONE ENCOUNTER
LVM for patient about upcoming appointment with  8-6-2020. Asked patient if she has ever seen a cardiologist before or had other cardiac testing done, if so where it was done.

## 2020-08-06 ENCOUNTER — OFFICE VISIT (OUTPATIENT)
Dept: CARDIOLOGY | Facility: MEDICAL CENTER | Age: 30
End: 2020-08-06
Payer: COMMERCIAL

## 2020-08-06 VITALS
HEART RATE: 91 BPM | SYSTOLIC BLOOD PRESSURE: 110 MMHG | DIASTOLIC BLOOD PRESSURE: 72 MMHG | BODY MASS INDEX: 31.05 KG/M2 | OXYGEN SATURATION: 97 % | WEIGHT: 159 LBS

## 2020-08-06 DIAGNOSIS — I49.1 PREMATURE ATRIAL BEAT: ICD-10-CM

## 2020-08-06 PROCEDURE — 99204 OFFICE O/P NEW MOD 45 MIN: CPT | Performed by: INTERNAL MEDICINE

## 2020-08-06 RX ORDER — VALACYCLOVIR HYDROCHLORIDE 500 MG/1
TABLET, FILM COATED ORAL
COMMUNITY
Start: 2020-06-11

## 2020-08-06 ASSESSMENT — FIBROSIS 4 INDEX: FIB4 SCORE: 1.52

## 2020-08-06 NOTE — PROGRESS NOTES
Arrhythmia Clinic Note (New patient)     DOS: 8/6/2020    Referring physician: Ashley Jalloh    Chief complaint/Reason for consult: PAC    HPI: 30-year-old female with no significant past medical history who gave birth to a daughter about 6 weeks ago, postpartum was noted to have irregular heart rate and EKG showed PAC.  She is referred to cardiology for follow-up.  She has had no symptoms to speak of, no palpitations, shortness of breath, chest pain, dyspnea, leg swelling, syncope, or presyncope.  She was told when she was very young that she might have a murmur, but that an echocardiogram was done that showed that her heart was normal.  She does not have any cardiac follow-up otherwise.  She did have one episode where she thought there was something going on with her heart while she was exercising about a year ago, but this went away on its own, and she has not had any feelings of abnormal sensation in her chest since.    ROS (+ highlighted in bold):  Constitutional: Fevers/chills/fatigue/weightloss  HEENT: Blurry vision/eye pain/sore throat/hearing loss  Respiratory: Shortness of breath/cough  Cardiovascular: Chest pain/palpitations/edema/orthopnea/syncope  GI: Nausea/vomitting/diarrhea  MSK: Arthralgias/myagias/muscle weakness  Skin: Rash/sores  Neurological: Numbness/tremors/vertigo  Endocrine: Excessive thirst/polyuria/cold intolerance/heat intolerance  Psych: Depression/anxiety    Past Medical History:   Diagnosis Date   • Anemia    • ASTHMA     exercise-induced asthma   • Nose trouble     nose bleeds       No past surgical history on file.    Social History     Socioeconomic History   • Marital status:      Spouse name: Not on file   • Number of children: Not on file   • Years of education: Not on file   • Highest education level: Not on file   Occupational History   • Not on file   Social Needs   • Financial resource strain: Not on file   • Food insecurity     Worry: Never true     Inability:  Never true   • Transportation needs     Medical: No     Non-medical: No   Tobacco Use   • Smoking status: Never Smoker   • Smokeless tobacco: Never Used   Substance and Sexual Activity   • Alcohol use: No   • Drug use: No   • Sexual activity: Yes     Partners: Male     Birth control/protection: None   Lifestyle   • Physical activity     Days per week: Not on file     Minutes per session: Not on file   • Stress: Not on file   Relationships   • Social connections     Talks on phone: Not on file     Gets together: Not on file     Attends [employer] service: Not on file     Active member of club or organization: Not on file     Attends meetings of clubs or organizations: Not on file     Relationship status: Not on file   • Intimate partner violence     Fear of current or ex partner: Not on file     Emotionally abused: Not on file     Physically abused: Not on file     Forced sexual activity: Not on file   Other Topics Concern   • Not on file   Social History Narrative   • Not on file       Family History   Problem Relation Age of Onset   • Diabetes Maternal Grandmother         type II   • Diabetes Maternal Grandfather         type II   • Heart Disease Maternal Grandfather         stents   • Heart Attack Maternal Grandfather    • Cancer Paternal Grandmother 60        reprodcutive organ   • Heart Attack Paternal Grandfather    • Hyperlipidemia Mother    • Hypertension Mother    • Arthritis Father    • Arthritis Brother        Allergies   Allergen Reactions   • Penicillins Hives       Current Outpatient Medications   Medication Sig Dispense Refill   • Prenatal Vit-Fe Sulfate-FA (PRENATAL MULTIVIT-IRON PO) Take  by mouth.     • valACYclovir (VALTREX) 500 MG Tab        No current facility-administered medications for this visit.        Physical Exam:  Vitals:    08/06/20 1542   BP: 110/72   BP Location: Left arm   Patient Position: Sitting   BP Cuff Size: Adult   Pulse: 91   SpO2: 97%   Weight: 72.1 kg (159 lb)     General  appearance: NAD, conversant   Eyes: anicteric sclerae, moist conjunctivae; no lid-lag; PERRLA  HENT: Atraumatic; oropharynx clear with moist mucous membranes and no mucosal ulcerations; normal hard and soft palate  Neck: Trachea midline; FROM, supple, no thyromegaly or lymphadenopathy  Lungs: CTA, with normal respiratory effort and no intercostal retractions  CV: RRR, no MRGs, no JVD   Abdomen: Soft, non-tender; no masses or HSM  Extremities: No peripheral edema or extremity lymphadenopathy  Skin: Normal temperature, turgor and texture; no rash, ulcers or subcutaneous nodules  Psych: Appropriate affect, alert and oriented to person, place and time    Data:  Lipids:   Lab Results   Component Value Date/Time    CHOLSTRLTOT 148 01/19/2019 07:53 AM    TRIGLYCERIDE 69 01/19/2019 07:53 AM    HDL 46 01/19/2019 07:53 AM    LDL 88 01/19/2019 07:53 AM        BMP:  Lab Results   Component Value Date/Time    SODIUM 141 01/19/2019 0753    POTASSIUM 3.9 01/19/2019 0753    CHLORIDE 108 01/19/2019 0753    CO2 26 01/19/2019 0753    GLUCOSE 86 01/19/2019 0753    BUN 7 (L) 01/19/2019 0753    CREATININE 0.86 01/19/2019 0753    CALCIUM 9.5 01/19/2019 0753    ANION 7.0 01/19/2019 0753        TSH:   No results found for: TSHULTRASEN     THYROXINE (T4):   No results found for: FREEDIR     CBC:   Lab Results   Component Value Date/Time    WBC 20.5 (H) 06/19/2020 09:20 AM    WBC 9.9 10/01/2012 03:33 PM    RBC 3.70 (L) 06/19/2020 09:20 AM    RBC 3.98 10/01/2012 03:33 PM    HEMOGLOBIN 10.9 (L) 06/19/2020 09:20 AM    HEMATOCRIT 33.7 (L) 06/19/2020 09:20 AM    MCV 91.1 06/19/2020 09:20 AM    MCV 90 10/01/2012 03:33 PM    MCH 29.5 06/19/2020 09:20 AM    MCH 28.9 10/01/2012 03:33 PM    MCHC 32.3 (L) 06/19/2020 09:20 AM    RDW 46.3 06/19/2020 09:20 AM    RDW 14.0 10/01/2012 03:33 PM    PLATELETCT 108 (L) 06/19/2020 09:20 AM    MPV 12.3 06/19/2020 09:20 AM    NEUTSPOLYS 81.50 (H) 06/18/2020 03:20 PM    LYMPHOCYTES 10.70 (L) 06/18/2020 03:20 PM     MONOCYTES 6.00 06/18/2020 03:20 PM    EOSINOPHILS 0.40 06/18/2020 03:20 PM    BASOPHILS 0.30 06/18/2020 03:20 PM    IMMGRAN 1.10 (H) 06/18/2020 03:20 PM    IMMGRAN 0 03/21/2012 01:45 PM    NRBC 0.00 06/18/2020 03:20 PM    NEUTS 11.47 (H) 06/18/2020 03:20 PM    NEUTS 6.0 03/21/2012 01:45 PM    LYMPHS 1.50 06/18/2020 03:20 PM    LYMPHS 1.8 03/21/2012 01:45 PM    MONOS 0.85 06/18/2020 03:20 PM    MONOS 0.5 03/21/2012 01:45 PM    EOS 0.06 06/18/2020 03:20 PM    EOS 0.1 03/21/2012 01:45 PM    BASO 0.04 06/18/2020 03:20 PM    BASO 0.0 03/21/2012 01:45 PM    IMMGRANAB 0.15 (H) 06/18/2020 03:20 PM    IMMGRANAB 0.0 03/21/2012 01:45 PM    NRBCAB 0.00 06/18/2020 03:20 PM        CBC w/o DIFF  Lab Results   Component Value Date/Time    WBC 20.5 (H) 06/19/2020 09:20 AM    WBC 9.9 10/01/2012 03:33 PM    RBC 3.70 (L) 06/19/2020 09:20 AM    RBC 3.98 10/01/2012 03:33 PM    HEMOGLOBIN 10.9 (L) 06/19/2020 09:20 AM    MCV 91.1 06/19/2020 09:20 AM    MCV 90 10/01/2012 03:33 PM    MCH 29.5 06/19/2020 09:20 AM    MCH 28.9 10/01/2012 03:33 PM    MCHC 32.3 (L) 06/19/2020 09:20 AM    RDW 46.3 06/19/2020 09:20 AM    RDW 14.0 10/01/2012 03:33 PM    MPV 12.3 06/19/2020 09:20 AM       EKG interpreted by me: Sinus rhythm with PAC    Impression/Plan:  1.  Premature atrial contraction    -Benign PACs likely the cause of irregular rate observed in the hospital.  These are asymptomatic.  If they start to become more symptomatic, we can order ambulatory monitoring to assess for arrhythmias or ectopy burden.  No indication to treat these currently however.  We could start a beta-blocker in the future if needed though.    Follow-up as needed    Tin Beaver MD  Cardiac Electrophysiology

## 2020-08-12 ENCOUNTER — NON-PROVIDER VISIT (OUTPATIENT)
Dept: URGENT CARE | Facility: PHYSICIAN GROUP | Age: 30
End: 2020-08-12

## 2020-08-12 DIAGNOSIS — Z11.1 ENCOUNTER FOR PPD TEST: Primary | ICD-10-CM

## 2020-08-12 PROCEDURE — 86580 TB INTRADERMAL TEST: CPT | Performed by: PHYSICIAN ASSISTANT

## 2020-08-12 NOTE — PROGRESS NOTES
Soy Zhu is a 30 y.o. female here for a non-provider visit for PPD placement -- Step 1 of 1    Reason for PPD:  school requirement    1. TB evaluation questionnaire completed by patient? Yes      -  If any answers marked yes did you contact a provider prior to placing? Not Indicated  2.  Patient notified to return to clinic for reading on: 8/14/2020  3.  PPD Placement documentation completed on TB evaluation questionnaire? Yes  4.  Location of TB evaluation questionnaire filed: front office

## 2020-08-14 ENCOUNTER — NON-PROVIDER VISIT (OUTPATIENT)
Dept: URGENT CARE | Facility: PHYSICIAN GROUP | Age: 30
End: 2020-08-14

## 2020-08-14 LAB — TB WHEAL 3D P 5 TU DIAM: 0 MM

## 2020-08-14 PROCEDURE — 99999 PR NO CHARGE: CPT | Performed by: FAMILY MEDICINE

## 2020-08-14 NOTE — PROGRESS NOTES
Soy Zhu is a 30 y.o. female here for a non-provider visit for PPD reading -- Step 1 of 1.      1.  Resulted in Epic under enter/edit results? Yes   2.  TB evaluation questionnaire scanned into chart and original given to patient?Yes      3. Was induration greater than 0 mm? No.        Routed to PCP? No

## 2021-04-18 ENCOUNTER — OFFICE VISIT (OUTPATIENT)
Dept: URGENT CARE | Facility: CLINIC | Age: 31
End: 2021-04-18
Payer: COMMERCIAL

## 2021-04-18 VITALS
TEMPERATURE: 98.2 F | OXYGEN SATURATION: 99 % | DIASTOLIC BLOOD PRESSURE: 70 MMHG | RESPIRATION RATE: 16 BRPM | HEART RATE: 117 BPM | SYSTOLIC BLOOD PRESSURE: 118 MMHG

## 2021-04-18 DIAGNOSIS — H10.31 ACUTE CONJUNCTIVITIS OF RIGHT EYE, UNSPECIFIED ACUTE CONJUNCTIVITIS TYPE: ICD-10-CM

## 2021-04-18 PROCEDURE — 99214 OFFICE O/P EST MOD 30 MIN: CPT | Performed by: PHYSICIAN ASSISTANT

## 2021-04-18 RX ORDER — POLYMYXIN B SULFATE AND TRIMETHOPRIM 1; 10000 MG/ML; [USP'U]/ML
1 SOLUTION OPHTHALMIC EVERY 4 HOURS
Qty: 10 ML | Refills: 0 | Status: SHIPPED | OUTPATIENT
Start: 2021-04-18

## 2021-04-18 ASSESSMENT — ENCOUNTER SYMPTOMS
EYE PAIN: 0
CHILLS: 0
SORE THROAT: 0
PHOTOPHOBIA: 0
EYE DISCHARGE: 1
SHORTNESS OF BREATH: 0
BLURRED VISION: 0
EYE REDNESS: 1
FEVER: 0
DOUBLE VISION: 0

## 2021-04-18 ASSESSMENT — VISUAL ACUITY: OU: 1

## 2021-04-18 NOTE — PROGRESS NOTES
Subjective:   Soy Zhu is a 31 y.o. female who presents for Eye Problem ([id number], eyes goopy, irritated, right eye worse than left, left ear pressure, right ear draining)    HPI  Patient presents the clinic with complaints of right eye irritation onset last night.  This morning she woke up with clear goopy eyes and irritation.  She also reports of bilateral ear pressure.  Denies a history of allergies.  The redness to her right eye is very mild.  She has minimal to mild itching.  No trauma.  No foreign body sensation.  Denies any eye pain, vision changes, blurry vision, double vision, light sensitivity, nasal congestion, runny nose, cough, chest pain, SOB.  She has no other complaints or concerns.    Review of Systems   Constitutional: Negative for chills and fever.   HENT: Negative.  Negative for congestion and sore throat.    Eyes: Positive for discharge and redness. Negative for blurred vision, double vision, photophobia and pain.   Respiratory: Negative for shortness of breath.    Cardiovascular: Negative for chest pain.       Medications:    • PRENATAL MULTIVIT-IRON PO  • valACYclovir Tabs    Allergies: Penicillins    Problem List: Soy Zhu has Ventral hernia without obstruction or gangrene and Benign gestational thrombocytopenia in third trimester (HCC) on their problem list.    Surgical History:  No past surgical history on file.    Past Social Hx: Soy Zhu  reports that she has never smoked. She has never used smokeless tobacco. She reports that she does not drink alcohol and does not use drugs.     Past Family Hx:  Soy Zhu family history includes Arthritis in her brother and father; Cancer (age of onset: 60) in her paternal grandmother; Diabetes in her maternal grandfather and maternal grandmother; Heart Attack in her maternal grandfather and paternal grandfather; Heart Disease in her maternal grandfather; Hyperlipidemia in her mother; Hypertension in her mother.      Problem list, medications, and allergies reviewed by myself today in Epic.     Objective:     /70 (BP Location: Left arm, Patient Position: Sitting, BP Cuff Size: Adult)   Pulse (!) 117   Temp 36.8 °C (98.2 °F) (Temporal)   Resp 16   SpO2 99%   Breastfeeding Yes     Physical Exam  Vitals reviewed.   Constitutional:       General: She is not in acute distress.     Appearance: Normal appearance. She is not ill-appearing or toxic-appearing.   HENT:      Right Ear: Tympanic membrane, ear canal and external ear normal.      Left Ear: Tympanic membrane, ear canal and external ear normal.   Eyes:      General: Lids are normal. Lids are everted, no foreign bodies appreciated. Vision grossly intact.         Right eye: No foreign body, discharge or hordeolum.      Conjunctiva/sclera:      Right eye: No chemosis, exudate or hemorrhage.     Pupils: Pupils are equal, round, and reactive to light.      Right eye: No corneal abrasion or fluorescein uptake.      Comments: Right eye:   Very minimal/slight conjunctival injection medially.    Cardiovascular:      Rate and Rhythm: Normal rate.   Pulmonary:      Effort: Pulmonary effort is normal.   Skin:     General: Skin is dry.   Neurological:      General: No focal deficit present.      Mental Status: She is oriented to person, place, and time.   Psychiatric:         Mood and Affect: Mood normal.         Behavior: Behavior normal.         Assessment/Associated Orders     1. Acute conjunctivitis of right eye, unspecified acute conjunctivitis type         Medical Decision Making      This is a patient who presents the clinic with complaints of right eye irritation and ear pressure onset last night.  Exam shows minimal conjunctival injection medially to the right eye.  No fluorescein uptake or signs of abrasion or foreign bodies.  Ear examination is normal.  Discussed most likely viral versus allergic. Contingent antibiotic prescription given to patient to fill upon  meeting criteria of guidelines discussed.   Patient will start over-the-counter antihistamine eyedrop.  Eye hygiene.  Hand hygiene.  Avoid rubbing eye.  Protection.    If no improvement with 2 to 3 days of antibiotic treatment, highly recommend she be seen immediately by her eye doctor or return here to the urgent care or present to the ER especially if any worsening redness, pain, vision changes or any other concerns.    I personally reviewed prior external notes and test results pertinent to today's visit. Red flags discussed   Supportive care, differential diagnoses, and indications for immediate follow-up discussed with patient.    Patient expresses understanding and agrees to plan. Patient denies any other questions or concerns.     Advised the patient to follow-up with the primary care physician for recheck, reevaluation, and consideration of further management.    Time spent evaluating the patient was 30 minutes which included preparing for the visit, obtaining history, examination, ordering labs/tests/procedures/medications, independent interpretation, discussion of plan, and counseling/education.     Please note that this dictation was created using voice recognition software. I have made a reasonable attempt to correct obvious errors, but I expect that there are errors of grammar and possibly content that I did not discover before finalizing the note.    This note was electronically signed by Art Perez PA-C

## 2021-05-06 ENCOUNTER — TELEPHONE (OUTPATIENT)
Dept: OBGYN | Facility: CLINIC | Age: 31
End: 2021-05-06

## 2021-05-06 NOTE — TELEPHONE ENCOUNTER
Patient called because she is pregnant and she wanted to know if she can travel by plane and if she can ride roller coasters.  Advised pt that she can travel by plane up 32 weeks and that she should not ride any roller coaster. Pt understood and transferred to [facility] to schedule appt.

## 2021-06-03 ENCOUNTER — GYNECOLOGY VISIT (OUTPATIENT)
Dept: OBGYN | Facility: CLINIC | Age: 31
End: 2021-06-03
Payer: COMMERCIAL

## 2021-06-03 VITALS — WEIGHT: 155 LBS | DIASTOLIC BLOOD PRESSURE: 72 MMHG | BODY MASS INDEX: 30.27 KG/M2 | SYSTOLIC BLOOD PRESSURE: 113 MMHG

## 2021-06-03 DIAGNOSIS — N93.8 DUB (DYSFUNCTIONAL UTERINE BLEEDING): Primary | ICD-10-CM

## 2021-06-03 LAB
INT CON NEG: NORMAL
INT CON POS: NORMAL
POC URINE PREGNANCY TEST: POSITIVE

## 2021-06-03 PROCEDURE — 81025 URINE PREGNANCY TEST: CPT | Performed by: OBSTETRICS & GYNECOLOGY

## 2021-06-03 PROCEDURE — 76830 TRANSVAGINAL US NON-OB: CPT | Performed by: OBSTETRICS & GYNECOLOGY

## 2021-06-03 PROCEDURE — 99214 OFFICE O/P EST MOD 30 MIN: CPT | Mod: 25 | Performed by: OBSTETRICS & GYNECOLOGY

## 2021-06-03 NOTE — PROGRESS NOTES
Chief complaint;  This patient is a 31 y.o. female , Patient's last menstrual period was 2021. presents complaining of dysfunctional uterine bleeding.    Review of systems-denies; chest pain, shortness of breath, fever, chills, abdominal pain  Past OB history-  OB History    Para Term  AB Living   2 2 2     2   SAB TAB Ectopic Molar Multiple Live Births           0 2      # Outcome Date GA Lbr Rey/2nd Weight Sex Delivery Anes PTL Lv   2 Term 20 39w4d / 00:05 3.825 kg (8 lb 6.9 oz) F Vag-Spont None N NASREEN   1 Term 10/14/12 38w0d  3.033 kg (6 lb 11 oz) F Vag-Spont None  NASREEN      Birth Comments: System Generated. Please review and update pregnancy details.     Past surgical history- History reviewed. No pertinent surgical history.  Past medical history-   Past Medical History:   Diagnosis Date   • Anemia    • ASTHMA     exercise-induced asthma   • Nose trouble     nose bleeds     Allergies- Penicillins  Present medications-   Outpatient Encounter Medications as of 6/3/2021   Medication Sig Dispense Refill   • polymixin-trimethoprim (POLYTRIM) 28604-8.1 UNIT/ML-% Solution Administer 1 Drop into the right eye every 4 hours. 10 mL 0   • valACYclovir (VALTREX) 500 MG Tab      • Prenatal Vit-Fe Sulfate-FA (PRENATAL MULTIVIT-IRON PO) Take  by mouth.       No facility-administered encounter medications on file as of 6/3/2021.     Family history- no history of breast or ovarian cancer  Social history-   Social History     Socioeconomic History   • Marital status:      Spouse name: Not on file   • Number of children: Not on file   • Years of education: Not on file   • Highest education level: Not on file   Occupational History   • Not on file   Tobacco Use   • Smoking status: Never Smoker   • Smokeless tobacco: Never Used   Vaping Use   • Vaping Use: Never used   Substance and Sexual Activity   • Alcohol use: No   • Drug use: No   • Sexual activity: Yes     Partners: Male     Birth  control/protection: None   Other Topics Concern   • Not on file   Social History Narrative   • Not on file     Social Determinants of Health     Financial Resource Strain:    • Difficulty of Paying Living Expenses:    Food Insecurity: No Food Insecurity   • Worried About Running Out of Food in the Last Year: Never true   • Ran Out of Food in the Last Year: Never true   Transportation Needs: No Transportation Needs   • Lack of Transportation (Medical): No   • Lack of Transportation (Non-Medical): No   Physical Activity:    • Days of Exercise per Week:    • Minutes of Exercise per Session:    Stress:    • Feeling of Stress :    Social Connections:    • Frequency of Communication with Friends and Family:    • Frequency of Social Gatherings with Friends and Family:    • Attends [employer] Services:    • Active Member of Clubs or Organizations:    • Attends Club or Organization Meetings:    • Marital Status:    Intimate Partner Violence:    • Fear of Current or Ex-Partner:    • Emotionally Abused:    • Physically Abused:    • Sexually Abused:          Physical examination;   Alert and oriented x3  General-well-developed well-nourished female in no apparent distress  Vitals:    06/03/21 0920   BP: 113/72   BP Location: Right arm   Patient Position: Sitting   Weight: 70.3 kg (155 lb)     Skin is warm and dry   HEENT-normocephalic,nontraumatic,PERRLA,EOMI  Throat- no edema or erythema  Neck-supple  Cardiovascular-regular rate and rhythm, normal S1-S2 without murmurs or gallops  Lungs-clear to auscultation bilaterally  Back-negative CVA tenderness  Abdomen-nondistended positive bowel sounds soft nontender without masses or hepatosplenomegaly  Pelvic examination;  External genitalia-without lesions  Vagina-no blood, no discharge  Cervix-closed,no gross lesions  Uterus-  10 weeks size, nontender  Adnexa- without mass or tenderness  Extremities-without cyanosis clubbing or edema  Neurologic-grossly intact    Transvaginal  ultrasound; as performed and read by myself; intrauterine gestational sac containing moreno pregnancy positive cardiac motion positive fetal motion crown-rump length consistent with 9 weeks 6 days, EDC 12/31/2021 + yolk sac no free pelvic fluid no adnexal masses    Impression;  Dysfunctional uterine bleeding-no evidence of ectopic or miscarriage    Plan;     Needs initial OB      30 Minutes total spent with the patient in face-to-face contact,5 separate minutes of total time utilized to perform  Ultrasound, greater than 50% of the time has been spent on counseling and coordination of care.  Early DU B counseling performed-all questions answered in detail

## 2021-06-03 NOTE — NON-PROVIDER
Pt here today for   Pt states no VB is taking prenatal, lots of nausea no vomiting   Good # [phone]   Pharmacy Confirmed.

## 2021-07-15 ENCOUNTER — HOSPITAL ENCOUNTER (OUTPATIENT)
Facility: MEDICAL CENTER | Age: 31
End: 2021-07-15
Attending: OBSTETRICS & GYNECOLOGY
Payer: COMMERCIAL

## 2021-07-15 ENCOUNTER — INITIAL PRENATAL (OUTPATIENT)
Dept: OBGYN | Facility: CLINIC | Age: 31
End: 2021-07-15
Payer: COMMERCIAL

## 2021-07-15 VITALS — DIASTOLIC BLOOD PRESSURE: 67 MMHG | BODY MASS INDEX: 30.66 KG/M2 | SYSTOLIC BLOOD PRESSURE: 100 MMHG | WEIGHT: 157 LBS

## 2021-07-15 DIAGNOSIS — Z34.82 PRENATAL CARE, SUBSEQUENT PREGNANCY IN SECOND TRIMESTER: ICD-10-CM

## 2021-07-15 PROCEDURE — 87491 CHLMYD TRACH DNA AMP PROBE: CPT

## 2021-07-15 PROCEDURE — 87591 N.GONORRHOEAE DNA AMP PROB: CPT

## 2021-07-15 PROCEDURE — 59401 PR NEW OB VISIT: CPT | Performed by: OBSTETRICS & GYNECOLOGY

## 2021-07-15 NOTE — PROGRESS NOTES
Pt here for NOB visit     ISAIAS: 12/31/2021 @ 15w6d   Good Phone #:[phone]   Pharmacy verified.  Last Pap: 2019 wnl   Pt declines CF.  Pt desires AFP. Would like Discuss Genetic testing   Pt states no VB or LOF   Pt states no other complaints for today.  EPDS:

## 2021-07-17 LAB
C TRACH DNA SPEC QL NAA+PROBE: NEGATIVE
N GONORRHOEA DNA SPEC QL NAA+PROBE: NEGATIVE
SPECIMEN SOURCE: NORMAL

## 2021-07-26 ENCOUNTER — HOSPITAL ENCOUNTER (OUTPATIENT)
Dept: LAB | Facility: MEDICAL CENTER | Age: 31
End: 2021-07-26
Attending: OBSTETRICS & GYNECOLOGY
Payer: COMMERCIAL

## 2021-07-26 DIAGNOSIS — Z34.82 PRENATAL CARE, SUBSEQUENT PREGNANCY IN SECOND TRIMESTER: ICD-10-CM

## 2021-07-26 LAB
ABO GROUP BLD: NORMAL
APPEARANCE UR: CLEAR
BASOPHILS # BLD AUTO: 0.4 % (ref 0–1.8)
BASOPHILS # BLD: 0.03 K/UL (ref 0–0.12)
BILIRUB UR QL STRIP.AUTO: NEGATIVE
BLD GP AB SCN SERPL QL: NORMAL
COLOR UR: YELLOW
EOSINOPHIL # BLD AUTO: 0.13 K/UL (ref 0–0.51)
EOSINOPHIL NFR BLD: 1.8 % (ref 0–6.9)
ERYTHROCYTE [DISTWIDTH] IN BLOOD BY AUTOMATED COUNT: 44.8 FL (ref 35.9–50)
GLUCOSE UR STRIP.AUTO-MCNC: NEGATIVE MG/DL
HBV SURFACE AG SER QL: ABNORMAL
HCT VFR BLD AUTO: 37.3 % (ref 37–47)
HGB BLD-MCNC: 12.4 G/DL (ref 12–16)
HIV 1+2 AB+HIV1 P24 AG SERPL QL IA: NORMAL
IMM GRANULOCYTES # BLD AUTO: 0.02 K/UL (ref 0–0.11)
IMM GRANULOCYTES NFR BLD AUTO: 0.3 % (ref 0–0.9)
KETONES UR STRIP.AUTO-MCNC: NEGATIVE MG/DL
LEUKOCYTE ESTERASE UR QL STRIP.AUTO: NEGATIVE
LYMPHOCYTES # BLD AUTO: 1.58 K/UL (ref 1–4.8)
LYMPHOCYTES NFR BLD: 21.4 % (ref 22–41)
MCH RBC QN AUTO: 30 PG (ref 27–33)
MCHC RBC AUTO-ENTMCNC: 33.2 G/DL (ref 33.6–35)
MCV RBC AUTO: 90.1 FL (ref 81.4–97.8)
MICRO URNS: ABNORMAL
MONOCYTES # BLD AUTO: 0.49 K/UL (ref 0–0.85)
MONOCYTES NFR BLD AUTO: 6.6 % (ref 0–13.4)
NEUTROPHILS # BLD AUTO: 5.13 K/UL (ref 2–7.15)
NEUTROPHILS NFR BLD: 69.5 % (ref 44–72)
NITRITE UR QL STRIP.AUTO: NEGATIVE
NRBC # BLD AUTO: 0 K/UL
NRBC BLD-RTO: 0 /100 WBC
PH UR STRIP.AUTO: 7 [PH] (ref 5–8)
PLATELET # BLD AUTO: 119 K/UL (ref 164–446)
PMV BLD AUTO: 12.3 FL (ref 9–12.9)
PROT UR QL STRIP: NEGATIVE MG/DL
RBC # BLD AUTO: 4.14 M/UL (ref 4.2–5.4)
RBC UR QL AUTO: NEGATIVE
RH BLD: NORMAL
RUBV AB SER QL: 281 IU/ML
SP GR UR STRIP.AUTO: 1.01
TREPONEMA PALLIDUM IGG+IGM AB [PRESENCE] IN SERUM OR PLASMA BY IMMUNOASSAY: ABNORMAL
UROBILINOGEN UR STRIP.AUTO-MCNC: 0.2 MG/DL
WBC # BLD AUTO: 7.4 K/UL (ref 4.8–10.8)

## 2021-07-26 PROCEDURE — 86780 TREPONEMA PALLIDUM: CPT

## 2021-07-26 PROCEDURE — 85025 COMPLETE CBC W/AUTO DIFF WBC: CPT

## 2021-07-26 PROCEDURE — 86850 RBC ANTIBODY SCREEN: CPT

## 2021-07-26 PROCEDURE — 87389 HIV-1 AG W/HIV-1&-2 AB AG IA: CPT

## 2021-07-26 PROCEDURE — 86762 RUBELLA ANTIBODY: CPT

## 2021-07-26 PROCEDURE — 36415 COLL VENOUS BLD VENIPUNCTURE: CPT

## 2021-07-26 PROCEDURE — 81511 FTL CGEN ABNOR FOUR ANAL: CPT

## 2021-07-26 PROCEDURE — 87340 HEPATITIS B SURFACE AG IA: CPT

## 2021-07-26 PROCEDURE — 81003 URINALYSIS AUTO W/O SCOPE: CPT

## 2021-07-26 PROCEDURE — 86901 BLOOD TYPING SEROLOGIC RH(D): CPT

## 2021-07-26 PROCEDURE — 86900 BLOOD TYPING SEROLOGIC ABO: CPT

## 2021-07-26 PROCEDURE — 80307 DRUG TEST PRSMV CHEM ANLYZR: CPT

## 2021-07-29 LAB
# FETUSES US: NORMAL
AFP MOM SERPL: 0.84
AFP SERPL-MCNC: 34 NG/ML
AGE - REPORTED: 31.7 YR
CURRENT SMOKER: NO
FAMILY MEMBER DISEASES HX: NO
GA METHOD: NORMAL
GA: NORMAL WK
HCG MOM SERPL: 1.11
HCG SERPL-ACNC: NORMAL IU/L
HX OF HEREDITARY DISORDERS: NO
IDDM PATIENT QL: NO
INHIBIN A MOM SERPL: 0.8
INHIBIN A SERPL-MCNC: 116 PG/ML
INTEGRATED SCN PATIENT-IMP: NORMAL
PATHOLOGY STUDY: NORMAL
SPECIMEN DRAWN SERPL: NORMAL
U ESTRIOL MOM SERPL: 0.91
U ESTRIOL SERPL-MCNC: 1.32 NG/ML